# Patient Record
Sex: MALE | Race: WHITE | Employment: FULL TIME | URBAN - METROPOLITAN AREA
[De-identification: names, ages, dates, MRNs, and addresses within clinical notes are randomized per-mention and may not be internally consistent; named-entity substitution may affect disease eponyms.]

---

## 2017-03-11 ENCOUNTER — APPOINTMENT (OUTPATIENT)
Dept: LAB | Age: 47
End: 2017-03-11
Attending: FAMILY MEDICINE
Payer: COMMERCIAL

## 2017-03-11 ENCOUNTER — OFFICE VISIT (OUTPATIENT)
Dept: FAMILY MEDICINE CLINIC | Facility: CLINIC | Age: 47
End: 2017-03-11

## 2017-03-11 VITALS
WEIGHT: 276 LBS | HEIGHT: 71 IN | DIASTOLIC BLOOD PRESSURE: 92 MMHG | RESPIRATION RATE: 18 BRPM | SYSTOLIC BLOOD PRESSURE: 143 MMHG | HEART RATE: 72 BPM | TEMPERATURE: 98 F | BODY MASS INDEX: 38.64 KG/M2

## 2017-03-11 DIAGNOSIS — I10 ESSENTIAL HYPERTENSION: ICD-10-CM

## 2017-03-11 DIAGNOSIS — R41.3 MEMORY DEFICITS: ICD-10-CM

## 2017-03-11 DIAGNOSIS — I10 ESSENTIAL HYPERTENSION: Primary | ICD-10-CM

## 2017-03-11 DIAGNOSIS — H91.90 HEARING LOSS, UNSPECIFIED HEARING LOSS TYPE, UNSPECIFIED LATERALITY: ICD-10-CM

## 2017-03-11 LAB
ALBUMIN SERPL BCP-MCNC: 4.2 G/DL (ref 3.5–4.8)
ALBUMIN/GLOB SERPL: 1.6 {RATIO} (ref 1–2)
ALP SERPL-CCNC: 67 U/L (ref 32–100)
ALT SERPL-CCNC: 28 U/L (ref 17–63)
ANION GAP SERPL CALC-SCNC: 5 MMOL/L (ref 0–18)
AST SERPL-CCNC: 19 U/L (ref 15–41)
BILIRUB SERPL-MCNC: 0.8 MG/DL (ref 0.3–1.2)
BUN SERPL-MCNC: 9 MG/DL (ref 8–20)
BUN/CREAT SERPL: 8.6 (ref 10–20)
CALCIUM SERPL-MCNC: 9.3 MG/DL (ref 8.5–10.5)
CHLORIDE SERPL-SCNC: 108 MMOL/L (ref 95–110)
CHOLEST SERPL-MCNC: 196 MG/DL (ref 110–200)
CO2 SERPL-SCNC: 30 MMOL/L (ref 22–32)
CREAT SERPL-MCNC: 1.05 MG/DL (ref 0.5–1.5)
ERYTHROCYTE [DISTWIDTH] IN BLOOD BY AUTOMATED COUNT: 13.7 % (ref 11–15)
GLOBULIN PLAS-MCNC: 2.7 G/DL (ref 2.5–3.7)
GLUCOSE SERPL-MCNC: 104 MG/DL (ref 70–99)
HCT VFR BLD AUTO: 44.4 % (ref 41–52)
HDLC SERPL-MCNC: 46 MG/DL
HGB BLD-MCNC: 15.3 G/DL (ref 13.5–17.5)
LDLC SERPL CALC-MCNC: 137 MG/DL (ref 0–99)
MCH RBC QN AUTO: 28.4 PG (ref 27–32)
MCHC RBC AUTO-ENTMCNC: 34.4 G/DL (ref 32–37)
MCV RBC AUTO: 82.4 FL (ref 80–100)
NONHDLC SERPL-MCNC: 150 MG/DL
OSMOLALITY UR CALC.SUM OF ELEC: 295 MOSM/KG (ref 275–295)
PLATELET # BLD AUTO: 163 K/UL (ref 140–400)
PMV BLD AUTO: 8.6 FL (ref 7.4–10.3)
POTASSIUM SERPL-SCNC: 5 MMOL/L (ref 3.3–5.1)
PROT SERPL-MCNC: 6.9 G/DL (ref 5.9–8.4)
RBC # BLD AUTO: 5.39 M/UL (ref 4.5–5.9)
SODIUM SERPL-SCNC: 143 MMOL/L (ref 136–144)
TRIGL SERPL-MCNC: 65 MG/DL (ref 1–149)
WBC # BLD AUTO: 4.9 K/UL (ref 4–11)

## 2017-03-11 PROCEDURE — 99212 OFFICE O/P EST SF 10 MIN: CPT | Performed by: FAMILY MEDICINE

## 2017-03-11 PROCEDURE — 85027 COMPLETE CBC AUTOMATED: CPT

## 2017-03-11 PROCEDURE — 99213 OFFICE O/P EST LOW 20 MIN: CPT | Performed by: FAMILY MEDICINE

## 2017-03-11 PROCEDURE — 80061 LIPID PANEL: CPT

## 2017-03-11 PROCEDURE — 36415 COLL VENOUS BLD VENIPUNCTURE: CPT

## 2017-03-11 PROCEDURE — 80053 COMPREHEN METABOLIC PANEL: CPT

## 2017-03-11 RX ORDER — LISINOPRIL 10 MG/1
10 TABLET ORAL
Qty: 30 TABLET | Refills: 11 | Status: SHIPPED | OUTPATIENT
Start: 2017-03-11 | End: 2018-11-27

## 2017-03-11 NOTE — PROGRESS NOTES
HPI:    Patient ID: Chilango Prince is a 40year old male. HPI Comments: Patient is here for follow up for chronic medical issues- HTN. The patient has not been on medications. . There are no acute issues and patient is requesting refills on lisinopril.  The management after testing. To monitor blood pressure; To call if any persistent elevation of blood pressure; Discussed good diet/activity; Follow up as needed. Routine follow up in 1-2 months or as needed.  Information provided for audiology for hearing eval

## 2017-06-15 ENCOUNTER — HOSPITAL ENCOUNTER (INPATIENT)
Facility: HOSPITAL | Age: 47
LOS: 6 days | Discharge: HOME | DRG: 603 | End: 2017-06-21
Attending: EMERGENCY MEDICINE | Admitting: HOSPITALIST
Payer: COMMERCIAL

## 2017-06-15 ENCOUNTER — OFFICE VISIT (OUTPATIENT)
Dept: FAMILY MEDICINE CLINIC | Facility: CLINIC | Age: 47
End: 2017-06-15

## 2017-06-15 ENCOUNTER — APPOINTMENT (OUTPATIENT)
Dept: GENERAL RADIOLOGY | Facility: HOSPITAL | Age: 47
DRG: 603 | End: 2017-06-15
Attending: EMERGENCY MEDICINE
Payer: COMMERCIAL

## 2017-06-15 VITALS
TEMPERATURE: 98 F | WEIGHT: 272 LBS | RESPIRATION RATE: 20 BRPM | HEIGHT: 71 IN | HEART RATE: 93 BPM | DIASTOLIC BLOOD PRESSURE: 78 MMHG | BODY MASS INDEX: 38.08 KG/M2 | SYSTOLIC BLOOD PRESSURE: 123 MMHG

## 2017-06-15 DIAGNOSIS — L08.9 INFECTION OF HAND: Primary | ICD-10-CM

## 2017-06-15 DIAGNOSIS — S61.431A PUNCTURE WOUND OF RIGHT HAND WITH INFECTION, INITIAL ENCOUNTER: Primary | ICD-10-CM

## 2017-06-15 DIAGNOSIS — L08.9 PUNCTURE WOUND OF RIGHT HAND WITH INFECTION, INITIAL ENCOUNTER: Primary | ICD-10-CM

## 2017-06-15 PROCEDURE — 73130 X-RAY EXAM OF HAND: CPT | Performed by: EMERGENCY MEDICINE

## 2017-06-15 PROCEDURE — 99222 1ST HOSP IP/OBS MODERATE 55: CPT | Performed by: HOSPITALIST

## 2017-06-15 PROCEDURE — 90715 TDAP VACCINE 7 YRS/> IM: CPT | Performed by: FAMILY MEDICINE

## 2017-06-15 PROCEDURE — 99213 OFFICE O/P EST LOW 20 MIN: CPT | Performed by: FAMILY MEDICINE

## 2017-06-15 PROCEDURE — 99212 OFFICE O/P EST SF 10 MIN: CPT | Performed by: FAMILY MEDICINE

## 2017-06-15 PROCEDURE — 90471 IMMUNIZATION ADMIN: CPT | Performed by: FAMILY MEDICINE

## 2017-06-15 RX ORDER — LISINOPRIL 10 MG/1
10 TABLET ORAL
Status: DISCONTINUED | OUTPATIENT
Start: 2017-06-16 | End: 2017-06-21

## 2017-06-15 RX ORDER — ACETAMINOPHEN 325 MG/1
650 TABLET ORAL EVERY 4 HOURS PRN
Status: DISCONTINUED | OUTPATIENT
Start: 2017-06-15 | End: 2017-06-21

## 2017-06-15 RX ORDER — SODIUM PHOSPHATE, DIBASIC AND SODIUM PHOSPHATE, MONOBASIC 7; 19 G/133ML; G/133ML
1 ENEMA RECTAL ONCE AS NEEDED
Status: DISCONTINUED | OUTPATIENT
Start: 2017-06-15 | End: 2017-06-21

## 2017-06-15 RX ORDER — MORPHINE SULFATE 2 MG/ML
2 INJECTION, SOLUTION INTRAMUSCULAR; INTRAVENOUS EVERY 2 HOUR PRN
Status: DISCONTINUED | OUTPATIENT
Start: 2017-06-15 | End: 2017-06-21

## 2017-06-15 RX ORDER — MORPHINE SULFATE 2 MG/ML
1 INJECTION, SOLUTION INTRAMUSCULAR; INTRAVENOUS EVERY 2 HOUR PRN
Status: DISCONTINUED | OUTPATIENT
Start: 2017-06-15 | End: 2017-06-21

## 2017-06-15 RX ORDER — BISACODYL 10 MG
10 SUPPOSITORY, RECTAL RECTAL
Status: DISCONTINUED | OUTPATIENT
Start: 2017-06-15 | End: 2017-06-21

## 2017-06-15 RX ORDER — HYDROCODONE BITARTRATE AND ACETAMINOPHEN 5; 325 MG/1; MG/1
1 TABLET ORAL EVERY 4 HOURS PRN
Status: DISCONTINUED | OUTPATIENT
Start: 2017-06-15 | End: 2017-06-21

## 2017-06-15 RX ORDER — KETOROLAC TROMETHAMINE 30 MG/ML
30 INJECTION, SOLUTION INTRAMUSCULAR; INTRAVENOUS ONCE
Status: COMPLETED | OUTPATIENT
Start: 2017-06-15 | End: 2017-06-15

## 2017-06-15 RX ORDER — ZOLPIDEM TARTRATE 5 MG/1
5 TABLET ORAL NIGHTLY PRN
Status: DISCONTINUED | OUTPATIENT
Start: 2017-06-15 | End: 2017-06-21

## 2017-06-15 RX ORDER — AMOXICILLIN AND CLAVULANATE POTASSIUM 875; 125 MG/1; MG/1
1 TABLET, FILM COATED ORAL 2 TIMES DAILY
Qty: 20 TABLET | Refills: 0 | Status: ON HOLD | OUTPATIENT
Start: 2017-06-15 | End: 2017-06-21

## 2017-06-15 RX ORDER — POLYETHYLENE GLYCOL 3350 17 G/17G
17 POWDER, FOR SOLUTION ORAL DAILY PRN
Status: DISCONTINUED | OUTPATIENT
Start: 2017-06-15 | End: 2017-06-21

## 2017-06-15 RX ORDER — DOCUSATE SODIUM 100 MG/1
100 CAPSULE, LIQUID FILLED ORAL 2 TIMES DAILY
Status: DISCONTINUED | OUTPATIENT
Start: 2017-06-15 | End: 2017-06-21

## 2017-06-15 RX ORDER — ONDANSETRON 2 MG/ML
4 INJECTION INTRAMUSCULAR; INTRAVENOUS EVERY 6 HOURS PRN
Status: DISCONTINUED | OUTPATIENT
Start: 2017-06-15 | End: 2017-06-21

## 2017-06-15 RX ORDER — MORPHINE SULFATE 4 MG/ML
4 INJECTION, SOLUTION INTRAMUSCULAR; INTRAVENOUS EVERY 2 HOUR PRN
Status: DISCONTINUED | OUTPATIENT
Start: 2017-06-15 | End: 2017-06-21

## 2017-06-15 RX ORDER — HYDROCODONE BITARTRATE AND ACETAMINOPHEN 5; 325 MG/1; MG/1
2 TABLET ORAL EVERY 4 HOURS PRN
Status: DISCONTINUED | OUTPATIENT
Start: 2017-06-15 | End: 2017-06-21

## 2017-06-15 NOTE — ED PROVIDER NOTES
Patient Seen in: [facility] AND [facility] Emergency Department    History   Patient presents with:  Cellulitis (integumentary, infectious)    Stated Complaint:     HPI    Patient is a 23-year-old male who states that yesterday at work he cut the palmar surface crease of the fourth MP joint on the palmar surface. There is surrounding redness and warmth. There is lymphangitic lines of the dorsal surface of the hand. There is pain with passive range of motion of the fourth finger.   3 motor function and capillary

## 2017-06-15 NOTE — PROGRESS NOTES
[facility] Dosing Service  Antibiotic Dosing    Karina Dougherty is a 40year old male for whom pharmacy is dosing Zosyn for treatment of  cellulitis and pneumonia. .  Other antibiotics (Not dosed by pharmacy): none    Allergies: has No Known Allergies.     V

## 2017-06-15 NOTE — PLAN OF CARE
Problem: Patient Centered Care  Goal: Patient preferences are identified and integrated in the patient’s plan of care  Interventions:  - What would you like us to know as we care for you?   - Provide timely, complete, and accurate information to patient/fa serum osmolarity and serum sodium as indicated or ordered  - Monitor response to interventions for patient’s volume status, including labs, urine output, blood pressure (other measures as available)  - Encourage oral intake as appropriate  - Instruct patie

## 2017-06-15 NOTE — ED INITIAL ASSESSMENT (HPI)
Pt sent here from dr Arleen Cha office for infected laceration to right ring finger that occurred yest while at work pt now has numbness, red streak, and decreased movement to finger. Pt was given a tetanus shot at the office pta.  Denies fever

## 2017-06-15 NOTE — PROGRESS NOTES
HPI:    Patient ID: Sondra Espinal is a 40year old male. HPI Comments: Pt presents with a superficial cut that occurred at work yesterday moving a palette. Pt had mild bleeding. Pt cleaned area and placed band aid on the area.  Pt also had a abrasion of t tablet by mouth 2 (two) times daily.            Imaging & Referrals:  TETANUS, DIPHTHERIA TOXOIDS AND ACELLULAR PERTUSIS VACCINE (TDAP), >7 YEARS, IM USE  PLASTIC SURGERY - INTERNAL       #[id number]

## 2017-06-16 PROCEDURE — 99252 IP/OBS CONSLTJ NEW/EST SF 35: CPT | Performed by: PLASTIC SURGERY

## 2017-06-16 PROCEDURE — 99233 SBSQ HOSP IP/OBS HIGH 50: CPT | Performed by: HOSPITALIST

## 2017-06-16 NOTE — PLAN OF CARE
Problem: Patient Centered Care  Goal: Patient preferences are identified and integrated in the patient’s plan of care  Interventions:  - What would you like us to know as we care for you?   - Provide timely, complete, and accurate information to patient/fa serum osmolarity and serum sodium as indicated or ordered  - Monitor response to interventions for patient’s volume status, including labs, urine output, blood pressure (other measures as available)  - Encourage oral intake as appropriate  - Instruct patie interventions unsuccessful or patient reports new pain   Outcome: Progressing    Problem: SAFETY ADULT - FALL  Goal: Free from fall injury  INTERVENTIONS:  - Assess pt frequently for physical needs  - Identify cognitive and physical deficits and behaviors

## 2017-06-16 NOTE — PROGRESS NOTES
[facility]    Progress Note    Alpheus Boast Patient Status:  Inpatient    1970 MRN [id number]   Location [facility] 5SW/SE Attending Beba Lopes MD   Hosp Day # 1 PCP Javier Foote MD     Subjective:     Constitut prophylaxis.  Patient low risk.  Encourage ambulation.  SCDs  Dispo: pending, continue IV anabiotics      Results:     Lab Results  Component Value Date   WBC 15.2* 06/16/2017   HGB 13.4* 06/16/2017   HCT 38.7* 06/16/2017    06/16/2017   CREATSERUM 0

## 2017-06-16 NOTE — CONSULTS
[facility]    PATIENT'S NAME: WALLACE FLORES   ATTENDING PHYSICIAN: Juan Millan MD   CONSULTING PHYSICIAN: Ramu Todd MD   PATIENT ACCOUNT#:   [id number]    LOCATION:  [address] [place] [address] #:   [id number]       DATE OF Hemoglobin is 13.4. IMPRESSION:  Right hand cellulitis secondary to right thumb paronychia. DISPOSITION:  There is no abscess or purulent tenosynovitis. No incision and drainage is indicated at this point.   This is a soft tissue infection with c

## 2017-06-16 NOTE — PLAN OF CARE
Problem: Patient Centered Care  Goal: Patient preferences are identified and integrated in the patient’s plan of care  Interventions:  - What would you like us to know as we care for you?  - Provide timely, complete, and accurate information to patient/fam serum osmolarity and serum sodium as indicated or ordered  - Monitor response to interventions for patient’s volume status, including labs, urine output, blood pressure (other measures as available)  - Encourage oral intake as appropriate  - Instruct patie interventions unsuccessful or patient reports new pain  Outcome: Progressing    Problem: SAFETY ADULT - FALL  Goal: Free from fall injury  INTERVENTIONS:  - Assess pt frequently for physical needs  - Identify cognitive and physical deficits and behaviors t

## 2017-06-16 NOTE — CONSULTS
INFECTIOUS DISEASE CONSULT NOTE    McDonald Grade Patient Status:  Inpatient    1970 MRN [id number]   Location [facility] 5SW/SE Attending Waleska Shen MD   Hosp Day # 1 PCP Nini Clarke 5-325 MG per tab 1 tablet, 1 tablet, Oral, Q4H PRN **OR** HYDROcodone-acetaminophen (NORCO) 5-325 MG per tab 2 tablet, 2 tablet, Oral, Q4H PRN  •  morphINE sulfate (PF) 2 MG/ML injection 1 mg, 1 mg, Intravenous, Q2H PRN **OR** morphINE sulfate (PF) 2 MG/ML 15.2*   PLT  141     Recent Labs   Lab  06/15/17   1027   GLU  108*   BUN  13   CREATSERUM  0.90   GFRAA  >60   GFRNAA  >60   CA  9.1   NA  138   K  3.7   CL  103   CO2  24       Microbiology    Reviewed in EMR    Radiology: Reviewed      Antibiotics: vanc

## 2017-06-16 NOTE — PROGRESS NOTES
[facility] dosing service    Initial Pharmacokinetic Consult for Vancomycin Dosing     Maryse Saucedo is a 40year old male admitted on 6/16 who is being treated for cellulitis.   Pharmacy has been asked to dose Vancomycin by Dr. Florin Lopez    He has No Known ug/mL.    3.  Pharmacy will need BUN/Scr daily while on Vancomycin to assess renal function. 4.  Pharmacy will follow and monitor renal function changes, toxicity and efficacy. Pharmacy will continue to follow him.   We appreciate the opportunity to a

## 2017-06-16 NOTE — H&P
[facility]    PATIENT'S NAME: WALLACE FLORES   ATTENDING PHYSICIAN: rBe Cruz MD   PATIENT ACCOUNT#:   [id number]    LOCATION:  [address] [place] [address] Road #:   [id number]       YOB: 1970  ADMISSION DATE:       06/15/2 PHYSICAL EXAMINATION:    GENERAL:  The patient is very pleasant, cooperative. Wife at bedside. HEENT:  Pupils round, reactive to light. Extraocular movements intact. Sclerae:  No icterus, no pallor.   Oral mucosa moist.  Throat:  No exudate, no eryt Encourage ambulation. I discussed with patient and his wife at the bedside. Both are agreeable to proposed management plan as outlined above.     Dictated By Peggy Marrero MD  d: 06/15/2017 18:54:30  t: 06/15/2017 20:59:42  Job [id number]

## 2017-06-16 NOTE — CONSULTS
PLASTIC SURGERY - FULL CONSULTATION DICTATED      IMPRESSION:  Right hand Cellulitis with lymphangiitis     DISPOSITION / PLAN:  Patient had a thumb paronychia draining pus several days ago. Developed cellulitis and lymphangiitis.   No fluctuance, or purul

## 2017-06-17 PROCEDURE — 99232 SBSQ HOSP IP/OBS MODERATE 35: CPT | Performed by: HOSPITALIST

## 2017-06-17 RX ORDER — 0.9 % SODIUM CHLORIDE 0.9 %
VIAL (ML) INJECTION
Status: COMPLETED
Start: 2017-06-17 | End: 2017-06-17

## 2017-06-17 NOTE — PROGRESS NOTES
[place] [facility]    Progress Note    Nya Jenkins Patient Status:  Inpatient    1970 MRN [id number]   Location [facility] 5SW/SE Attending Mason Ambriz MD   Hosp Day # 2 PCP Leia Calloway MD     Subjective:     Constitut pad    Hypertension, stable.  Continue medications. Deep venous thrombosis prophylaxis.  Patient low risk.  Encourage ambulation.  SCDs  Dispo: pending, continue IV anabiotics    D/w pt, his wife, RN, and ID        Results:       Lab Results  Component V

## 2017-06-17 NOTE — PROGRESS NOTES
[facility]    Progress Note    Shanae Tay Patient Status:  Inpatient    1970 MRN [id number]   Location [facility] 5SW/SE Attending Jeremy Matias MD   Hosp Day # 1 PCP Lisa Brown MD     Subjective:     Constitut prophylaxis.  Patient low risk.  Encourage ambulation. SCDs  Dispo: pending, continue IV anabiotics    D/w pt, his wife, RN, and ID  Greater than 35 minutes spent, >50% spent counseling and coordinating of care as outlined above.       Results:       Lab Re

## 2017-06-17 NOTE — PROGRESS NOTES
INFECTIOUS DISEASE PROGRESS NOTE    Karina Dougherty Patient Status:  Inpatient    1970 MRN [id number]   Location [facility] 5SW/SE Attending Adelina Loja MD   Hosp Day # 2 PCP Nini Kim MD     Subjective:  Patient seen and examined patient, wife    [facility]  [phone]

## 2017-06-17 NOTE — PLAN OF CARE
Problem: Patient Centered Care  Goal: Patient preferences are identified and integrated in the patient’s plan of care  Interventions:  - What would you like us to know as we care for you?  - Provide timely, complete, and accurate information to patient/fam serum osmolarity and serum sodium as indicated or ordered  - Monitor response to interventions for patient’s volume status, including labs, urine output, blood pressure (other measures as available)  - Encourage oral intake as appropriate  - Instruct patie interventions unsuccessful or patient reports new pain   Outcome: Progressing    Problem: SAFETY ADULT - FALL  Goal: Free from fall injury  INTERVENTIONS:  - Assess pt frequently for physical needs  - Identify cognitive and physical deficits and behaviors

## 2017-06-18 PROCEDURE — 99232 SBSQ HOSP IP/OBS MODERATE 35: CPT | Performed by: HOSPITALIST

## 2017-06-18 RX ORDER — 0.9 % SODIUM CHLORIDE 0.9 %
VIAL (ML) INJECTION
Status: COMPLETED
Start: 2017-06-18 | End: 2017-06-18

## 2017-06-18 NOTE — PLAN OF CARE
Problem: Patient Centered Care  Goal: Patient preferences are identified and integrated in the patient’s plan of care  Interventions:  - What would you like us to know as we care for you?  - Provide timely, complete, and accurate information to patient/fam Instruct patient on fluid and nutrition restrictions as appropriate   Outcome: Progressing    Problem: SKIN/TISSUE INTEGRITY - ADULT  Goal: Skin integrity remains intact  INTERVENTIONS  - Assess and document risk factors for pressure ulcer development  - A based on assessment  - Modify environment to reduce risk of injury  - Provide assistive devices as appropriate  - Consider OT/PT consult to assist with strengthening/mobility  - Encourage toileting schedule   Outcome: Progressing    Comments:   Vanco troug

## 2017-06-18 NOTE — PROGRESS NOTES
[facility] dosing service    Follow-up Pharmacokinetic Consult for Vancomycin Dosing     Ann Sample is a 40year old male admitted on 6/16/2017 who is being treated for cellulitis. Patient is on day 2 of Vancomycin 1500mg IV q12h.   Goal trough is 10- [facility]  6/17/2017  10:11 PM  [address] Extension: [phone]

## 2017-06-18 NOTE — PROGRESS NOTES
[place] [facility] - [facility]    Progress Note    Larry Paling Patient Status:  Inpatient    1970 MRN [id number]   Location [facility] 5SW/SE Attending Kenya Martinez MD   Hosp Day # 3 PCP Filippo Quintanilla MD     Subjective:     Constitut control.     - elevate  - warm pad    Hypertension, stable.  Continue medications. Deep venous thrombosis prophylaxis.  Patient low risk.  Encourage ambulation.  SCDs  Dispo: pending, continue IV anabiotics    D/w pt, his wife, RN, and ID        Results:

## 2017-06-19 ENCOUNTER — APPOINTMENT (OUTPATIENT)
Dept: MRI IMAGING | Facility: HOSPITAL | Age: 47
DRG: 603 | End: 2017-06-19
Attending: HOSPITALIST
Payer: COMMERCIAL

## 2017-06-19 PROCEDURE — 99232 SBSQ HOSP IP/OBS MODERATE 35: CPT | Performed by: HOSPITALIST

## 2017-06-19 PROCEDURE — 73218 MRI UPPER EXTREMITY W/O DYE: CPT | Performed by: HOSPITALIST

## 2017-06-19 RX ORDER — 0.9 % SODIUM CHLORIDE 0.9 %
VIAL (ML) INJECTION
Status: COMPLETED
Start: 2017-06-19 | End: 2017-06-19

## 2017-06-19 NOTE — PLAN OF CARE
Problem: Patient/Family Goals  Goal: Patient/Family Long Term Goal  Patient’s Long Term Goal: To go home. Interventions:  - Take medications as prescribed by your doctor. - Labs as ordered.   - See additional Care Plan goals for specific interventions Instruct patient on fluid and nutrition restrictions as appropriate   Outcome: Progressing    Problem: SKIN/TISSUE INTEGRITY - ADULT  Goal: Skin integrity remains intact  INTERVENTIONS  - Assess and document risk factors for pressure ulcer development  - A Problem: SAFETY ADULT - FALL  Goal: Free from fall injury  INTERVENTIONS:  - Assess pt frequently for physical needs  - Identify cognitive and physical deficits and behaviors that affect risk of falls.   - [place] fall precautions as indicated by asse

## 2017-06-19 NOTE — PROGRESS NOTES
120 [facility] dosing service    Follow-up Pharmacokinetic Consult for Vancomycin Dosing     Holly Marie is a 40year old mal who is being treated for cellulitis. Patient is on day 4 of Vancomycin 1.75 gm IV Q 8 hours. Goal trough is 10-15 ug/mL.     He h BUN/Scr daily while on Vancomycin to assess renal function. 4.  Pharmacy will follow and monitor renal function changes, toxicity and efficacy.     Olympia Apgar, PharmD  2017  9:25 AM  [facility] Extension: [phone]

## 2017-06-19 NOTE — PROGRESS NOTES
[place] [facility] - [facility]    Progress Note    Dioni Bartholomew Patient Status:  Inpatient    1970 MRN [id number]   Location [facility] 5SW/SE Attending Glory Lundberg MD   Hosp Day # 4 PCP Stephanie Panchal MD     Subjective:     Constitu Pain control.     - elevate  - warm pad  - MRI today    Hypertension, stable.  Continue medications. Deep venous thrombosis prophylaxis.  Patient low risk.  Encourage ambulation.  SCDs  Dispo: pending, continue IV anabiotics    D/w pt, his wife, RN, and

## 2017-06-19 NOTE — PROGRESS NOTES
INFECTIOUS DISEASE PROGRESS NOTE    Radha Parrish Patient Status:  Inpatient    1970 MRN [id number]   Location [facility] 5SW/SE Attending Preet Davis MD   Hosp Day # 4 PCP José Miguel Hernandes MD     Subjective:  Patient seen and examined reviewed labs, micro, imaging report  - discussed with patient, RN, primary    Marylee Kocher, MD  [facility]  [phone]

## 2017-06-19 NOTE — PLAN OF CARE
Problem: Patient Centered Care  Goal: Patient preferences are identified and integrated in the patient’s plan of care  Interventions:  - What would you like us to know as we care for you?   - Provide timely, complete, and accurate information to patient/fa serum osmolarity and serum sodium as indicated or ordered  - Monitor response to interventions for patient’s volume status, including labs, urine output, blood pressure (other measures as available)  - Encourage oral intake as appropriate  - Instruct patie physical deficits and behaviors that affect risk of falls.   - [place] fall precautions as indicated by assessment.  - Educate pt/family on patient safety including physical limitations  - Instruct pt to call for assistance with activity based on assessme

## 2017-06-19 NOTE — PROGRESS NOTES
[facility]    Progress Note    Vanessa Norwood Patient Status:  Inpatient    1970 MRN [id number]   Location [facility] 5SW/SE Attending Radha Subramanian MD   Hosp Day # 4 PCP Maximilian Lynn MD     Subjective:     Constitu Pain control.     - elevate  - warm pad  - MRI today    Hypertension, stable.  Continue medications. Deep venous thrombosis prophylaxis.  Patient low risk.  Encourage ambulation.  SCDs  Dispo: pending, continue IV anabiotics    D/w pt, his wife, RN, and

## 2017-06-20 PROCEDURE — 99232 SBSQ HOSP IP/OBS MODERATE 35: CPT | Performed by: HOSPITALIST

## 2017-06-20 RX ORDER — 0.9 % SODIUM CHLORIDE 0.9 %
VIAL (ML) INJECTION
Status: COMPLETED
Start: 2017-06-20 | End: 2017-06-20

## 2017-06-20 NOTE — PLAN OF CARE
Problem: Patient Centered Care  Goal: Patient preferences are identified and integrated in the patient’s plan of care  Interventions:  - What would you like us to know as we care for you? Patient wants to be kept updated  - Provide timely, complete, and acc and assess for signs and symptoms of volume excess or deficit  - Monitor intake, output and patient weight  - Monitor urine specific gravity, serum osmolarity and serum sodium as indicated or ordered  - Monitor response to interventions for patient’s volum and evaluate response  - Consider cultural and social influences on pain and pain management  - Manage/alleviate anxiety  - Utilize distraction and/or relaxation techniques  - Monitor for opioid side effects  - Notify MD/LIP if interventions unsuccessful o

## 2017-06-20 NOTE — PROGRESS NOTES
[place] [facility] - [facility]    Progress Note    Ann Sample Patient Status:  Inpatient    1970 MRN [id number]   Location [facility] 5SW/SE Attending Scooby Zimmerman MD   Hosp Day # 5 PCP Clau Parada MD     Subjective:     Constitu abscess, no evidence of osteo-    Hypertension, stable.  Continue medications. Deep venous thrombosis prophylaxis.  Patient low risk.  Encourage ambulation.  SCDs  Dispo: one more day of IV anabiotics, home tomorrow on orals    D/w pt, his wife, RN, and

## 2017-06-20 NOTE — PROGRESS NOTES
[place] [facility] - [facility]    Progress Note    Ainsley Kendrick Patient Status:  Inpatient    1970 MRN [id number]   Location [facility] 5SW/SE Attending Citlalli Hughes MD   Hosp Day # 5 PCP Brit Ferrell MD     Subjective:     Constit any abscess, no evidence of osteo  - lost iv access, RN to re attempt, no PICC as patient will be d/c home on orals. Hypertension, stable.  Continue medications. Deep venous thrombosis prophylaxis.  Patient low risk.  Encourage ambulation.  SCDs  Dis

## 2017-06-21 VITALS
BODY MASS INDEX: 38.08 KG/M2 | WEIGHT: 272 LBS | HEART RATE: 69 BPM | SYSTOLIC BLOOD PRESSURE: 128 MMHG | DIASTOLIC BLOOD PRESSURE: 76 MMHG | TEMPERATURE: 98 F | OXYGEN SATURATION: 95 % | HEIGHT: 71 IN | RESPIRATION RATE: 18 BRPM

## 2017-06-21 PROCEDURE — 99239 HOSP IP/OBS DSCHRG MGMT >30: CPT | Performed by: HOSPITALIST

## 2017-06-21 RX ORDER — DOXYCYCLINE 100 MG/1
100 TABLET ORAL 2 TIMES DAILY
Qty: 14 TABLET | Refills: 0 | Status: SHIPPED | OUTPATIENT
Start: 2017-06-21 | End: 2017-06-28

## 2017-06-21 RX ORDER — DOXYCYCLINE 100 MG/1
100 TABLET ORAL 2 TIMES DAILY
Qty: 14 TABLET | Refills: 0 | Status: SHIPPED | OUTPATIENT
Start: 2017-06-21 | End: 2017-06-21

## 2017-06-21 RX ORDER — AMOXICILLIN AND CLAVULANATE POTASSIUM 875; 125 MG/1; MG/1
1 TABLET, FILM COATED ORAL 2 TIMES DAILY
Qty: 14 TABLET | Refills: 0 | Status: SHIPPED | OUTPATIENT
Start: 2017-06-21 | End: 2017-06-28

## 2017-06-21 RX ORDER — AMOXICILLIN AND CLAVULANATE POTASSIUM 875; 125 MG/1; MG/1
1 TABLET, FILM COATED ORAL 2 TIMES DAILY
Qty: 14 TABLET | Refills: 0 | Status: SHIPPED | OUTPATIENT
Start: 2017-06-21 | End: 2017-06-21

## 2017-06-21 NOTE — PROGRESS NOTES
INFECTIOUS DISEASE PROGRESS NOTE    Clarita Alexander Patient Status:  Inpatient    1970 MRN [id number]   Location [facility] 5SW/SE Attending Abilio Bartholomew MD   Hosp Day # 5 PCP Yajaira Epstein MD     Subjective:  Patient seen and examined labs, micro, imaging report  - discussed with patient, RN, primary    Josh Tyler MD  [facility]  [phone]

## 2017-06-21 NOTE — PLAN OF CARE
Problem: Patient Centered Care  Goal: Patient preferences are identified and integrated in the patient’s plan of care  Interventions:  - What would you like us to know as we care for you?  Patient wants to go home today  - Provide timely, complete, and accu Monitor labs and assess for signs and symptoms of volume excess or deficit  - Monitor intake, output and patient weight  - Monitor urine specific gravity, serum osmolarity and serum sodium as indicated or ordered  - Monitor response to interventions for pa influences on pain and pain management  - Manage/alleviate anxiety  - Utilize distraction and/or relaxation techniques  - Monitor for opioid side effects  - Notify MD/LIP if interventions unsuccessful or patient reports new pain   Outcome: Lashae Hagen

## 2017-06-21 NOTE — PROGRESS NOTES
Patient was seen and examined. Much improvement to hand redness and swelling, including improvement in redness on right forearm. Discussed discharge plans with patient and wife at bedside. Discussed discharge meds, follow up, and instructions.   All que

## 2017-06-21 NOTE — PLAN OF CARE
Pt discharge from unit, verbal and written prescriptions given to pt all questions answered, PIV removed.  Pt left unit in stable condition, j

## 2017-06-21 NOTE — DISCHARGE SUMMARY
[facility] HOSPITALIST  DISCHARGE SUMMARY     Chevis Maker Patient Status:  Inpatient    1970 MRN [id number]   Location [facility] 5SW/SE Attending Joni Lim MD   Hosp Day # 6 PCP Stacey Em MD     DATE OF ADMISSION: 6/15/2017  DATE Roxana Upton intact distal pulses. No gallop, rub, murmur. Pulm: Effort and breath sounds normal. No distress, wheezes, rales, rhonchi. Abd: Soft, NTND, BS normal, no mass, no HSM, no rebound/guarding. Neuro: Normal reflexes, CN.  Sensory/motor exams grossly normal

## 2017-06-22 ENCOUNTER — TELEPHONE (OUTPATIENT)
Dept: INTERNAL MEDICINE UNIT | Facility: HOSPITAL | Age: 47
End: 2017-06-22

## 2017-06-22 NOTE — TELEPHONE ENCOUNTER
Please schedule patient for a hospital follow up appointment with pcp . Pt was discharged on 6/21/17.

## 2017-06-22 NOTE — PAYOR COMM NOTE
REF: [id number]  REQUESTING ADDITIONAL DAYS    6/17/17  Subjective:      Constitutional: Negative for fever, chills and fatigue.    Respiratory: Negative for cough, shortness of breath and wheezing.    Cardiovascular: Negative for chest pain and leg swelli anabiotics    D/w pt, his wife, RN, and ID          Results:        Lab Results  Component  Value  Date    [facility] MEJIAS 11.9*  06/17/2017    HGB  12.9*  06/17/2017    HCT  37.2*  06/17/2017    PLT  141  06/17/2017    CREATSERUM  0.90  06/15/2017    BUN  13  06/15/ ambulation.  SCDs  Dispo: pending, continue IV anabiotics            6/19/17  Skin:         Right hand pain/redness looks better today, +streaks of redness on right forearm not worse, decreased swelling, increased ROM in hand/fingers   Neurological: Negativ 18, height 5' 11\" (1.803 m), weight 272 lb (123.378 kg), SpO2 99 %.     Constitutional: He is oriented to person, place, and time. He appears well-nourished. Eyes: EOM are normal.   Neck: Normal range of motion.    Cardiovascular: Normal rate.    Pulmona

## 2017-07-03 ENCOUNTER — OFFICE VISIT (OUTPATIENT)
Dept: FAMILY MEDICINE CLINIC | Facility: CLINIC | Age: 47
End: 2017-07-03

## 2017-07-03 VITALS
SYSTOLIC BLOOD PRESSURE: 123 MMHG | HEART RATE: 71 BPM | WEIGHT: 274 LBS | HEIGHT: 71 IN | BODY MASS INDEX: 38.36 KG/M2 | TEMPERATURE: 98 F | DIASTOLIC BLOOD PRESSURE: 88 MMHG

## 2017-07-03 DIAGNOSIS — L03.113 CELLULITIS OF HAND, RIGHT: ICD-10-CM

## 2017-07-03 PROCEDURE — 99212 OFFICE O/P EST SF 10 MIN: CPT | Performed by: FAMILY MEDICINE

## 2017-07-03 PROCEDURE — 99213 OFFICE O/P EST LOW 20 MIN: CPT | Performed by: FAMILY MEDICINE

## 2017-07-03 NOTE — PROGRESS NOTES
HPI:    Patient ID: Hallie Menjivar is a 40year old male. Patient is here for follow up from recent hospitalization for right hand cellulitis. Evaluation - MRI hand and blood cultures were unremarkable.  See discharge note for details of admission and hosp and Unasyn. ID and plastic surgery were consulted. No surgery necessary. MRI did not show deeper infection or osteomyelitis. Patient started to improve after several days. Swelling and lymphangitis resolved. Pain resolved.   Blood cultures remain nega follow-up instructions were reviewed with the patient and they verbalized understanding and agreement. They understand to return to the emergency room for any concerning signs or symptoms.   Greater than 30 minutes spent on discharge              Review of

## 2017-12-30 ENCOUNTER — OFFICE VISIT (OUTPATIENT)
Dept: FAMILY MEDICINE CLINIC | Facility: CLINIC | Age: 47
End: 2017-12-30

## 2017-12-30 VITALS
BODY MASS INDEX: 38.64 KG/M2 | TEMPERATURE: 98 F | WEIGHT: 276 LBS | DIASTOLIC BLOOD PRESSURE: 88 MMHG | SYSTOLIC BLOOD PRESSURE: 132 MMHG | HEART RATE: 91 BPM | HEIGHT: 71 IN

## 2017-12-30 DIAGNOSIS — J01.10 ACUTE NON-RECURRENT FRONTAL SINUSITIS: Primary | ICD-10-CM

## 2017-12-30 DIAGNOSIS — J06.9 ACUTE UPPER RESPIRATORY INFECTION: ICD-10-CM

## 2017-12-30 PROBLEM — S61.431A PUNCTURE WOUND OF RIGHT HAND WITH INFECTION: Status: RESOLVED | Noted: 2017-06-15 | Resolved: 2017-12-30

## 2017-12-30 PROBLEM — L08.9 PUNCTURE WOUND OF RIGHT HAND WITH INFECTION: Status: RESOLVED | Noted: 2017-06-15 | Resolved: 2017-12-30

## 2017-12-30 PROBLEM — S61.431A: Status: RESOLVED | Noted: 2017-06-15 | Resolved: 2017-12-30

## 2017-12-30 PROBLEM — I10 ESSENTIAL HYPERTENSION: Status: ACTIVE | Noted: 2017-12-30

## 2017-12-30 PROBLEM — L08.9: Status: RESOLVED | Noted: 2017-06-15 | Resolved: 2017-12-30

## 2017-12-30 PROCEDURE — 99213 OFFICE O/P EST LOW 20 MIN: CPT | Performed by: PHYSICIAN ASSISTANT

## 2017-12-30 PROCEDURE — 99212 OFFICE O/P EST SF 10 MIN: CPT | Performed by: PHYSICIAN ASSISTANT

## 2017-12-30 RX ORDER — CODEINE PHOSPHATE AND GUAIFENESIN 10; 100 MG/5ML; MG/5ML
5 SOLUTION ORAL EVERY 6 HOURS PRN
Qty: 118 ML | Refills: 1 | Status: SHIPPED | OUTPATIENT
Start: 2017-12-30 | End: 2018-11-27

## 2017-12-30 RX ORDER — AMOXICILLIN AND CLAVULANATE POTASSIUM 875; 125 MG/1; MG/1
1 TABLET, FILM COATED ORAL 2 TIMES DAILY
Qty: 20 TABLET | Refills: 0 | Status: SHIPPED | OUTPATIENT
Start: 2017-12-30 | End: 2018-01-09

## 2017-12-30 NOTE — PROGRESS NOTES
HPI:    Patient ID: Ranjeet Page is a 56year old male. Patient presents for cough, chest congestion, sinus congestion, frontal headache, bilateral ear pain, and fatigue for over one week. Symptoms began as cold symptoms but have been worsening.   He de oropharynx is clear and moist and mucous membranes are normal.   Neck: Neck supple. Cardiovascular: Normal rate, regular rhythm and normal heart sounds. Pulmonary/Chest: Effort normal and breath sounds normal. No respiratory distress.  He has no wheeze

## 2018-01-15 ENCOUNTER — NURSE TRIAGE (OUTPATIENT)
Dept: OTHER | Age: 48
End: 2018-01-15

## 2018-01-15 RX ORDER — AMOXICILLIN AND CLAVULANATE POTASSIUM 875; 125 MG/1; MG/1
1 TABLET, FILM COATED ORAL 2 TIMES DAILY
Qty: 20 TABLET | Refills: 0 | Status: SHIPPED | OUTPATIENT
Start: 2018-01-15 | End: 2018-01-25

## 2018-01-15 NOTE — TELEPHONE ENCOUNTER
Message noted. Rx for antibiotic sent to pharmacy. Patient should schedule office visit if symptoms are not improving or with any worsening.

## 2018-01-15 NOTE — TELEPHONE ENCOUNTER
Action Requested: Summary for Provider     []  Critical Lab, Recommendations Needed  [x] Need Additional Advice  []   FYI    []   Need Orders  [x] Need Medications Sent to Pharmacy  []  Other     SUMMARY: patient last seen and treated for sinusitis 12/30/1

## 2018-11-27 ENCOUNTER — OFFICE VISIT (OUTPATIENT)
Dept: FAMILY MEDICINE CLINIC | Facility: CLINIC | Age: 48
End: 2018-11-27
Payer: COMMERCIAL

## 2018-11-27 VITALS
DIASTOLIC BLOOD PRESSURE: 83 MMHG | SYSTOLIC BLOOD PRESSURE: 141 MMHG | HEIGHT: 70.5 IN | WEIGHT: 281 LBS | HEART RATE: 97 BPM | RESPIRATION RATE: 20 BRPM | BODY MASS INDEX: 39.78 KG/M2

## 2018-11-27 DIAGNOSIS — I10 ESSENTIAL HYPERTENSION: Primary | ICD-10-CM

## 2018-11-27 PROCEDURE — 99213 OFFICE O/P EST LOW 20 MIN: CPT | Performed by: FAMILY MEDICINE

## 2018-11-27 PROCEDURE — 99212 OFFICE O/P EST SF 10 MIN: CPT | Performed by: FAMILY MEDICINE

## 2018-11-27 RX ORDER — LISINOPRIL 10 MG/1
10 TABLET ORAL
Qty: 90 TABLET | Refills: 3 | Status: SHIPPED | OUTPATIENT
Start: 2018-11-27 | End: 2020-10-13

## 2018-11-27 NOTE — PROGRESS NOTES
HPI:    Patient ID: Radha Parrish is a 53year old male. Patient is here for follow up for chronic medical issues- hypertension. The patient is compliant with medications and no side effects. There are no acute issues and patient is requesting refills.  H [E]      Meds This Visit:  Requested Prescriptions     Signed Prescriptions Disp Refills   • lisinopril 10 MG Oral Tab 90 tablet 3     Sig: Take 1 tablet (10 mg total) by mouth once daily.        Imaging & Referrals:  None       AF#[id number]

## 2019-02-16 ENCOUNTER — APPOINTMENT (OUTPATIENT)
Dept: LAB | Age: 49
End: 2019-02-16
Attending: FAMILY MEDICINE
Payer: COMMERCIAL

## 2019-02-16 DIAGNOSIS — I10 ESSENTIAL HYPERTENSION: ICD-10-CM

## 2019-02-16 LAB
ALBUMIN SERPL-MCNC: 4 G/DL (ref 3.4–5)
ALBUMIN/GLOB SERPL: 1.3 {RATIO} (ref 1–2)
ALP LIVER SERPL-CCNC: 61 U/L (ref 45–117)
ALT SERPL-CCNC: 46 U/L (ref 16–61)
ANION GAP SERPL CALC-SCNC: 5 MMOL/L (ref 0–18)
AST SERPL-CCNC: 17 U/L (ref 15–37)
BILIRUB SERPL-MCNC: 0.7 MG/DL (ref 0.1–2)
BUN BLD-MCNC: 11 MG/DL (ref 7–18)
BUN/CREAT SERPL: 10.8 (ref 10–20)
CALCIUM BLD-MCNC: 9.1 MG/DL (ref 8.5–10.1)
CHLORIDE SERPL-SCNC: 106 MMOL/L (ref 98–107)
CHOLEST SMN-MCNC: 211 MG/DL (ref ?–200)
CO2 SERPL-SCNC: 29 MMOL/L (ref 21–32)
CREAT BLD-MCNC: 1.02 MG/DL (ref 0.7–1.3)
DEPRECATED RDW RBC AUTO: 40.5 FL (ref 35.1–46.3)
ERYTHROCYTE [DISTWIDTH] IN BLOOD BY AUTOMATED COUNT: 13.3 % (ref 11–15)
GLOBULIN PLAS-MCNC: 3.1 G/DL (ref 2.8–4.4)
GLUCOSE BLD-MCNC: 104 MG/DL (ref 70–99)
HCT VFR BLD AUTO: 48.4 % (ref 39–53)
HDLC SERPL-MCNC: 49 MG/DL (ref 40–59)
HGB BLD-MCNC: 16.1 G/DL (ref 13–17.5)
LDLC SERPL CALC-MCNC: 127 MG/DL (ref ?–100)
M PROTEIN MFR SERPL ELPH: 7.1 G/DL (ref 6.4–8.2)
MCH RBC QN AUTO: 28.1 PG (ref 26–34)
MCHC RBC AUTO-ENTMCNC: 33.3 G/DL (ref 31–37)
MCV RBC AUTO: 84.6 FL (ref 80–100)
NONHDLC SERPL-MCNC: 162 MG/DL (ref ?–130)
OSMOLALITY SERPL CALC.SUM OF ELEC: 290 MOSM/KG (ref 275–295)
PLATELET # BLD AUTO: 181 10(3)UL (ref 150–450)
POTASSIUM SERPL-SCNC: 4.1 MMOL/L (ref 3.5–5.1)
RBC # BLD AUTO: 5.72 X10(6)UL (ref 4.3–5.7)
SODIUM SERPL-SCNC: 140 MMOL/L (ref 136–145)
TRIGL SERPL-MCNC: 174 MG/DL (ref 30–149)
WBC # BLD AUTO: 6.2 X10(3) UL (ref 4–11)

## 2019-02-16 PROCEDURE — 80053 COMPREHEN METABOLIC PANEL: CPT

## 2019-02-16 PROCEDURE — 36415 COLL VENOUS BLD VENIPUNCTURE: CPT

## 2019-02-16 PROCEDURE — 80061 LIPID PANEL: CPT

## 2019-02-16 PROCEDURE — 85027 COMPLETE CBC AUTOMATED: CPT

## 2019-12-05 ENCOUNTER — OFFICE VISIT (OUTPATIENT)
Dept: FAMILY MEDICINE CLINIC | Facility: CLINIC | Age: 49
End: 2019-12-05
Payer: COMMERCIAL

## 2019-12-05 VITALS
HEART RATE: 89 BPM | BODY MASS INDEX: 39.5 KG/M2 | OXYGEN SATURATION: 96 % | TEMPERATURE: 99 F | SYSTOLIC BLOOD PRESSURE: 127 MMHG | WEIGHT: 279 LBS | DIASTOLIC BLOOD PRESSURE: 83 MMHG | HEIGHT: 70.5 IN

## 2019-12-05 DIAGNOSIS — J06.9 ACUTE UPPER RESPIRATORY INFECTION: Primary | ICD-10-CM

## 2019-12-05 PROCEDURE — 99213 OFFICE O/P EST LOW 20 MIN: CPT | Performed by: FAMILY MEDICINE

## 2019-12-05 RX ORDER — DOXYCYCLINE HYCLATE 100 MG/1
100 CAPSULE ORAL 2 TIMES DAILY
Qty: 14 CAPSULE | Refills: 0 | Status: SHIPPED | OUTPATIENT
Start: 2019-12-05 | End: 2020-10-13

## 2019-12-05 RX ORDER — LISINOPRIL 10 MG/1
10 TABLET ORAL
Qty: 90 TABLET | Refills: 3 | Status: CANCELLED | OUTPATIENT
Start: 2019-12-05

## 2019-12-05 RX ORDER — CODEINE PHOSPHATE AND GUAIFENESIN 10; 100 MG/5ML; MG/5ML
10 SOLUTION ORAL NIGHTLY
Qty: 100 ML | Refills: 0 | Status: SHIPPED | OUTPATIENT
Start: 2019-12-05 | End: 2020-10-13

## 2019-12-05 NOTE — PROGRESS NOTES
HPI:    Patient ID: Christin Rodriguez is a 51year old male. HPI  Patient presents with:  Chest Congestion: chest congestion, sore throat, bilateral ear pain  x 1 week     Review of Systems   Constitutional: Negative.     HENT: Positive for ear pain and sore

## 2020-10-13 ENCOUNTER — OFFICE VISIT (OUTPATIENT)
Dept: FAMILY MEDICINE CLINIC | Facility: CLINIC | Age: 50
End: 2020-10-13
Payer: COMMERCIAL

## 2020-10-13 VITALS
BODY MASS INDEX: 39.64 KG/M2 | HEIGHT: 70.3 IN | TEMPERATURE: 98 F | HEART RATE: 80 BPM | SYSTOLIC BLOOD PRESSURE: 131 MMHG | WEIGHT: 280 LBS | DIASTOLIC BLOOD PRESSURE: 86 MMHG | RESPIRATION RATE: 16 BRPM

## 2020-10-13 DIAGNOSIS — Z00.00 ROUTINE PHYSICAL EXAMINATION: ICD-10-CM

## 2020-10-13 DIAGNOSIS — I10 ESSENTIAL HYPERTENSION: ICD-10-CM

## 2020-10-13 PROCEDURE — 3079F DIAST BP 80-89 MM HG: CPT | Performed by: FAMILY MEDICINE

## 2020-10-13 PROCEDURE — 99396 PREV VISIT EST AGE 40-64: CPT | Performed by: FAMILY MEDICINE

## 2020-10-13 PROCEDURE — 3075F SYST BP GE 130 - 139MM HG: CPT | Performed by: FAMILY MEDICINE

## 2020-10-13 PROCEDURE — 3008F BODY MASS INDEX DOCD: CPT | Performed by: FAMILY MEDICINE

## 2020-10-13 RX ORDER — LISINOPRIL 10 MG/1
10 TABLET ORAL
Qty: 90 TABLET | Refills: 3 | Status: SHIPPED | OUTPATIENT
Start: 2020-10-13

## 2020-10-13 NOTE — PROGRESS NOTES
HPI:    Patient ID: Moustapha Jose is a 53year old male. Patient is here for routine physical exam. No acute issues. Patient is requesting blood testing. Diet and exercise have been fair.  Past medical history, family history, and social history were revi thyromegaly present. Cardiovascular: Normal rate, regular rhythm, normal heart sounds and intact distal pulses. Pulmonary/Chest: Effort normal and breath sounds normal.   Abdominal: Soft. Bowel sounds are normal. He exhibits no distension.  There is no

## 2020-10-21 ENCOUNTER — LAB ENCOUNTER (OUTPATIENT)
Dept: LAB | Age: 50
End: 2020-10-21
Attending: FAMILY MEDICINE
Payer: COMMERCIAL

## 2020-10-21 DIAGNOSIS — Z00.00 ROUTINE PHYSICAL EXAMINATION: ICD-10-CM

## 2020-10-21 PROCEDURE — 85027 COMPLETE CBC AUTOMATED: CPT

## 2020-10-21 PROCEDURE — 80053 COMPREHEN METABOLIC PANEL: CPT

## 2020-10-21 PROCEDURE — 36415 COLL VENOUS BLD VENIPUNCTURE: CPT

## 2020-10-21 PROCEDURE — 80061 LIPID PANEL: CPT

## 2022-04-15 ENCOUNTER — OFFICE VISIT (OUTPATIENT)
Dept: FAMILY MEDICINE CLINIC | Facility: CLINIC | Age: 52
End: 2022-04-15
Payer: COMMERCIAL

## 2022-04-15 ENCOUNTER — LAB ENCOUNTER (OUTPATIENT)
Dept: LAB | Age: 52
End: 2022-04-15
Payer: COMMERCIAL

## 2022-04-15 VITALS
BODY MASS INDEX: 40.01 KG/M2 | WEIGHT: 282.63 LBS | HEART RATE: 72 BPM | TEMPERATURE: 98 F | SYSTOLIC BLOOD PRESSURE: 125 MMHG | DIASTOLIC BLOOD PRESSURE: 85 MMHG | RESPIRATION RATE: 18 BRPM | HEIGHT: 70.5 IN

## 2022-04-15 DIAGNOSIS — Z00.00 WELL ADULT HEALTH CHECK: ICD-10-CM

## 2022-04-15 DIAGNOSIS — Z00.00 WELL ADULT HEALTH CHECK: Primary | ICD-10-CM

## 2022-04-15 DIAGNOSIS — Z12.11 COLON CANCER SCREENING: ICD-10-CM

## 2022-04-15 DIAGNOSIS — Z01.00 EYE EXAM, ROUTINE: ICD-10-CM

## 2022-04-15 LAB
ALBUMIN SERPL-MCNC: 4.2 G/DL (ref 3.4–5)
ALBUMIN/GLOB SERPL: 1.3 {RATIO} (ref 1–2)
ALP LIVER SERPL-CCNC: 73 U/L
ALT SERPL-CCNC: 50 U/L
ANION GAP SERPL CALC-SCNC: 7 MMOL/L (ref 0–18)
AST SERPL-CCNC: 20 U/L (ref 15–37)
BASOPHILS # BLD AUTO: 0.01 X10(3) UL (ref 0–0.2)
BASOPHILS NFR BLD AUTO: 0.2 %
BILIRUB SERPL-MCNC: 0.6 MG/DL (ref 0.1–2)
BUN BLD-MCNC: 13 MG/DL (ref 7–18)
BUN/CREAT SERPL: 13.1 (ref 10–20)
CALCIUM BLD-MCNC: 9.6 MG/DL (ref 8.5–10.1)
CHLORIDE SERPL-SCNC: 107 MMOL/L (ref 98–112)
CHOLEST SERPL-MCNC: 198 MG/DL (ref ?–200)
CO2 SERPL-SCNC: 29 MMOL/L (ref 21–32)
COMPLEXED PSA SERPL-MCNC: 1.13 NG/ML (ref ?–4)
CREAT BLD-MCNC: 0.99 MG/DL
DEPRECATED RDW RBC AUTO: 38.1 FL (ref 35.1–46.3)
EOSINOPHIL # BLD AUTO: 0.11 X10(3) UL (ref 0–0.7)
EOSINOPHIL NFR BLD AUTO: 2.1 %
ERYTHROCYTE [DISTWIDTH] IN BLOOD BY AUTOMATED COUNT: 12.6 % (ref 11–15)
FASTING PATIENT LIPID ANSWER: YES
FASTING STATUS PATIENT QL REPORTED: YES
GLOBULIN PLAS-MCNC: 3.2 G/DL (ref 2.8–4.4)
GLUCOSE BLD-MCNC: 101 MG/DL (ref 70–99)
HCT VFR BLD AUTO: 45.9 %
HDLC SERPL-MCNC: 50 MG/DL (ref 40–59)
HGB BLD-MCNC: 16 G/DL
IMM GRANULOCYTES # BLD AUTO: 0.02 X10(3) UL (ref 0–1)
IMM GRANULOCYTES NFR BLD: 0.4 %
LDLC SERPL CALC-MCNC: 132 MG/DL (ref ?–100)
LYMPHOCYTES # BLD AUTO: 1.62 X10(3) UL (ref 1–4)
LYMPHOCYTES NFR BLD AUTO: 31.5 %
MCH RBC QN AUTO: 29 PG (ref 26–34)
MCHC RBC AUTO-ENTMCNC: 34.9 G/DL (ref 31–37)
MCV RBC AUTO: 83.2 FL
MONOCYTES # BLD AUTO: 0.44 X10(3) UL (ref 0.1–1)
MONOCYTES NFR BLD AUTO: 8.5 %
NEUTROPHILS # BLD AUTO: 2.95 X10 (3) UL (ref 1.5–7.7)
NEUTROPHILS # BLD AUTO: 2.95 X10(3) UL (ref 1.5–7.7)
NEUTROPHILS NFR BLD AUTO: 57.3 %
NONHDLC SERPL-MCNC: 148 MG/DL (ref ?–130)
OSMOLALITY SERPL CALC.SUM OF ELEC: 296 MOSM/KG (ref 275–295)
PLATELET # BLD AUTO: 202 10(3)UL (ref 150–450)
POTASSIUM SERPL-SCNC: 4 MMOL/L (ref 3.5–5.1)
PROT SERPL-MCNC: 7.4 G/DL (ref 6.4–8.2)
RBC # BLD AUTO: 5.52 X10(6)UL
SODIUM SERPL-SCNC: 143 MMOL/L (ref 136–145)
TRIGL SERPL-MCNC: 89 MG/DL (ref 30–149)
VLDLC SERPL CALC-MCNC: 16 MG/DL (ref 0–30)
WBC # BLD AUTO: 5.2 X10(3) UL (ref 4–11)

## 2022-04-15 PROCEDURE — 80061 LIPID PANEL: CPT

## 2022-04-15 PROCEDURE — 99386 PREV VISIT NEW AGE 40-64: CPT

## 2022-04-15 PROCEDURE — 3008F BODY MASS INDEX DOCD: CPT

## 2022-04-15 PROCEDURE — 36415 COLL VENOUS BLD VENIPUNCTURE: CPT

## 2022-04-15 PROCEDURE — 3079F DIAST BP 80-89 MM HG: CPT

## 2022-04-15 PROCEDURE — 3074F SYST BP LT 130 MM HG: CPT

## 2022-04-15 PROCEDURE — 85025 COMPLETE CBC W/AUTO DIFF WBC: CPT

## 2022-04-15 PROCEDURE — 80053 COMPREHEN METABOLIC PANEL: CPT

## 2022-04-15 RX ORDER — LISINOPRIL 10 MG/1
10 TABLET ORAL
Qty: 90 TABLET | Refills: 1 | Status: SHIPPED | OUTPATIENT
Start: 2022-04-15

## 2022-04-17 PROBLEM — Z12.11 COLON CANCER SCREENING: Status: ACTIVE | Noted: 2022-04-17

## 2022-09-20 RX ORDER — LISINOPRIL 10 MG/1
10 TABLET ORAL DAILY
Qty: 90 TABLET | Refills: 1 | Status: SHIPPED | OUTPATIENT
Start: 2022-09-20 | End: 2023-03-22

## 2022-09-20 NOTE — TELEPHONE ENCOUNTER
Refill passed per [facility] [facility] protocol.      Requested Prescriptions   Pending Prescriptions Disp Refills    LISINOPRIL 10 MG Oral Tab [Pharmacy Med Name: LISINOPRIL 10MG TABLETS] 90 tablet 1     Sig: TAKE 1 TABLET(10 MG) BY MOUTH EVERY DAY        Hypertensive Medications Protocol Passed - 9/19/2022  1:36 PM        Passed - In person appointment in the past 12 or next 3 months       Recent Outpatient Visits              5 months ago Well adult health check    [facility], [address] [place] Adie, APRN    Office Visit    1 year ago Routine physical examination    Kellen Feliciano MD    Office Visit    2 years ago Acute upper respiratory infection    [facility], [address] [place], DO    Office Visit    3 years ago Essential hypertension    [facility], [address] MD    Office Visit    4 years ago Acute non-recurrent frontal sinusitis    [facility], [address] [place] Adie, PA-C    Office Visit                 Passed - Last BP reading less than 140/90     BP Readings from Last 1 Encounters:  04/15/22 : 125/85                Passed - CMP or BMP in past 6 months     Recent Results (from the past 4392 hour(s))   COMP METABOLIC PANEL (14)    Collection Time: 04/15/22 10:32 AM   Result Value Ref Range    Glucose 101 (H) 70 - 99 mg/dL    Sodium 143 136 - 145 mmol/L    Potassium 4.0 3.5 - 5.1 mmol/L    Chloride 107 98 - 112 mmol/L    CO2 29.0 21.0 - 32.0 mmol/L    Anion Gap 7 0 - 18 mmol/L    BUN 13 7 - 18 mg/dL    Creatinine 0.99 0.70 - 1.30 mg/dL    BUN/CREA Ratio 13.1 10.0 - 20.0    Calcium, Total 9.6 8.5 - 10.1 mg/dL    Calculated Osmolality 296 (H) 275 - 295 mOsm/kg    GFR, Non- 88 >=60    GFR, -American 102 >=60    ALT 50 16 - 61 U/L    AST 20 15 - 37 U/L    Alkaline Phosphatase 73 45 - 117 U/L    Bilirubin, Total 0.6 0.1 - 2.0 mg/dL    Total Protein 7.4 6.4 - 8.2 g/dL    Albumin 4.2 3.4 - 5.0 g/dL    Globulin  3.2 2.8 - 4.4 g/dL    A/G Ratio 1.3 1.0 - 2.0    Patient Fasting for CMP? Yes      *Note: Due to a large number of results and/or encounters for the requested time period, some results have not been displayed. A complete set of results can be found in Results Review.                  Passed - In person appointment or virtual visit in the past 6 months       Recent Outpatient Visits              5 months ago [facility]    [facility], [address] Warden Sams, APRN    Office Visit    1 year ago Routine physical examination    Ryan Gee MD    Office Visit    2 years ago Acute upper respiratory infection    [facility], [address] Henretta Lehmann,     Office Visit    3 years ago Essential hypertension    Ryan Gee MD    Office Visit    4 years ago Acute non-recurrent frontal sinusitis    [facility], [address] Warden Sams, PA-C    Office Visit                 Passed - EGFRCR or GFRNAA > 50     GFR Evaluation  GFRNAA: 88 , resulted on 4/15/2022                   Recent Outpatient Visits              5 months ago [facility], [address] Warden Sams, [facility] Visit    1 year ago Routine physical examination    Ryan Gee MD    Office Visit    2 years ago Acute upper respiratory infection    [facility], [address] Henretta Lehmann,     Office Visit    3 years ago Essential hypertension    Ryan Gee MD    Office Visit    4 years ago Acute non-recurrent frontal sinusitis    [facility], [address] Warden Sams, [employer]    Office Visit

## 2023-02-16 ENCOUNTER — TELEMEDICINE (OUTPATIENT)
Dept: FAMILY MEDICINE CLINIC | Facility: CLINIC | Age: 53
End: 2023-02-16

## 2023-02-16 DIAGNOSIS — J01.90 ACUTE SINUSITIS, RECURRENCE NOT SPECIFIED, UNSPECIFIED LOCATION: Primary | ICD-10-CM

## 2023-02-16 PROCEDURE — 99213 OFFICE O/P EST LOW 20 MIN: CPT | Performed by: FAMILY MEDICINE

## 2023-02-16 RX ORDER — AMOXICILLIN 500 MG/1
500 CAPSULE ORAL 3 TIMES DAILY
Qty: 30 CAPSULE | Refills: 0 | Status: SHIPPED | OUTPATIENT
Start: 2023-02-16 | End: 2023-02-26

## 2023-04-18 ENCOUNTER — TELEMEDICINE (OUTPATIENT)
Dept: FAMILY MEDICINE CLINIC | Facility: CLINIC | Age: 53
End: 2023-04-18

## 2023-04-18 DIAGNOSIS — J02.9 SORE THROAT: Primary | ICD-10-CM

## 2023-04-18 PROCEDURE — 99213 OFFICE O/P EST LOW 20 MIN: CPT | Performed by: FAMILY MEDICINE

## 2023-04-18 RX ORDER — AMOXICILLIN 875 MG/1
875 TABLET, COATED ORAL 2 TIMES DAILY
Qty: 20 TABLET | Refills: 0 | Status: SHIPPED | OUTPATIENT
Start: 2023-04-18

## 2024-01-30 ENCOUNTER — OFFICE VISIT (OUTPATIENT)
Dept: PODIATRY CLINIC | Facility: CLINIC | Age: 54
End: 2024-01-30
Payer: COMMERCIAL

## 2024-01-30 DIAGNOSIS — B35.1 ONYCHOMYCOSIS: Primary | ICD-10-CM

## 2024-01-30 PROCEDURE — 99203 OFFICE O/P NEW LOW 30 MIN: CPT | Performed by: STUDENT IN AN ORGANIZED HEALTH CARE EDUCATION/TRAINING PROGRAM

## 2024-01-31 ENCOUNTER — LAB ENCOUNTER (OUTPATIENT)
Dept: LAB | Facility: HOSPITAL | Age: 54
End: 2024-01-31
Attending: STUDENT IN AN ORGANIZED HEALTH CARE EDUCATION/TRAINING PROGRAM
Payer: COMMERCIAL

## 2024-01-31 DIAGNOSIS — B35.1 ONYCHOMYCOSIS: ICD-10-CM

## 2024-01-31 LAB
ALBUMIN SERPL-MCNC: 4.3 G/DL (ref 3.2–4.8)
ALP LIVER SERPL-CCNC: 69 U/L
ALT SERPL-CCNC: 32 U/L
AST SERPL-CCNC: 19 U/L (ref ?–34)
BILIRUB DIRECT SERPL-MCNC: 0.2 MG/DL (ref ?–0.3)
BILIRUB SERPL-MCNC: 0.6 MG/DL (ref 0.3–1.2)
PROT SERPL-MCNC: 7 G/DL (ref 5.7–8.2)

## 2024-01-31 PROCEDURE — 80076 HEPATIC FUNCTION PANEL: CPT

## 2024-01-31 PROCEDURE — 36415 COLL VENOUS BLD VENIPUNCTURE: CPT

## 2024-01-31 RX ORDER — TERBINAFINE HYDROCHLORIDE 250 MG/1
250 TABLET ORAL DAILY
Qty: 90 TABLET | Refills: 0 | Status: SHIPPED | OUTPATIENT
Start: 2024-01-31 | End: 2024-04-30

## 2024-01-31 NOTE — PROGRESS NOTES
[facility]  Progress Note      Raul Carrillo is a 53 year old male.   Chief Complaint   Patient presents with    Toenail Care     Consult - left foot, brittle toenails, has tried multiple OTC remedies to no avail - denies pain             HPI:   Pt is a pleasant 53 year old male who PTC for evaluation of discolored and thickened left foot toenails. Pt has tried multiple OTC remedies with no improvement in symptoms       Allergies: Patient has no known allergies.    Current Outpatient Medications   Medication Sig Dispense Refill    terbinafine 250 MG Oral Tab Take 1 tablet (250 mg total) by mouth daily. 90 tablet 0    lisinopril 10 MG Oral Tab Take 1 tablet (10 mg total) by mouth daily. 90 tablet 3    amoxicillin 875 MG Oral Tab Take 1 tablet (875 mg total) by mouth 2 (two) times daily. (Patient not taking: Reported on 1/30/2024) 20 tablet 0      Past Medical History:   Diagnosis Date    Essential hypertension       Past Surgical History:   Procedure Laterality Date    HEAD SURGERY PROC UNLISTED        Family History   Problem Relation Age of Onset    Hypertension Father     Heart Disorder Father     Hypertension Mother     Diabetes Maternal Grandfather       Social History     Socioeconomic History    Marital status:    Tobacco Use    Smoking status: Never    Smokeless tobacco: Never   Vaping Use    Vaping Use: Never used   Substance and Sexual Activity    Alcohol use: No     Alcohol/week: 0.0 standard drinks of alcohol    Drug use: No           REVIEW OF SYSTEMS:     Denies nause, fever, chills  No calf pain  Denies chest pain or SOB      EXAM:   There were no vitals taken for this visit.  GENERAL: well developed, well nourished, in no apparent distress  EXTREMITIES:   1. Integument: Normal skin temperature and turgor. Toenails x 5 of left foot and discolored, thickened with subungal debri.   2. Vascular: Dorsalis pedis two out of four bilateral and posterior tibial pulses two out of   four  bilateral, capillary refill normal.   3. Musculoskeletal: All muscle groups are graded 5 out of 5 in the foot and ankle.   4. Neurological: Normal sharp dull sensation; reflexes normal.             ASSESSMENT AND PLAN:   Diagnoses and all orders for this visit:    Onychomycosis  -     Hepatic Function Panel (7); Future    Other orders  -     terbinafine 250 MG Oral Tab; Take 1 tablet (250 mg total) by mouth daily.        Plan:     Reviewed treatment options for nail dystrophy / onychomycosis including:   No treatment and monitoring, topical meds, oral meds, nail removal and laser treatment.  Advantages and disadvantages of each reviewed. Using oral agents would require regular   blood test monitoring due to risk of hepatotoxicity and other adverse reactions including drug interactions.   Topical meds are much safer yet carry very low efficacy.  Pt has opted for oral treatment  Hepatic labs obtained and within normal limits  Rx for oral terbinafine to be taken as directed  Repeat hepatic labs in 45 days      The patient indicates understanding of these issues and agrees to the plan.        Cristina Hernández DPM

## 2024-02-29 ENCOUNTER — TELEPHONE (OUTPATIENT)
Dept: PODIATRY CLINIC | Facility: CLINIC | Age: 54
End: 2024-02-29

## 2024-02-29 RX ORDER — CICLOPIROX 80 MG/ML
SOLUTION TOPICAL
Qty: 1 EACH | Refills: 3 | Status: SHIPPED | OUTPATIENT
Start: 2024-02-29

## 2024-02-29 NOTE — TELEPHONE ENCOUNTER
No need for new hepatic since patient discontinue medication and was for short amount of time. I can prescribe a topical, it won't be as effective but no side effects

## 2024-02-29 NOTE — TELEPHONE ENCOUNTER
Pt called stating pt stopped taking the rx. On Monday 2-26-24 due to abdominal pain.  Please call pt

## 2024-02-29 NOTE — TELEPHONE ENCOUNTER
S/w patient- patient states that he started lamisil at the beginning of the month. At the 5 day genet of taking the medication, he states that he started to feel a dull abdominal pain. He states that it was tolerable, but is has persisted while being on the medication. He states that it has gotten slightly worse of the last week and he decided to stop the medication. I informed him that was a good idea as this medication is the only change he has made before feeling this discomfort. He stopped taking the medication on Monday and has since had improvement in sytmptoms. He is wondering if there are any topical options her can use now that he is not taking the oral terbinafine. He is also wondering if he should take the repeat hepatic test or if that is even necessary now that he is not taking the medication anymore.     Please advise

## 2024-02-29 NOTE — TELEPHONE ENCOUNTER
S/w patient- Informed him of ciclopirox nail solution. I did inform him to reach out to PCP if abdominal pain were to get worse or if it were to persist.

## 2024-03-05 ENCOUNTER — TELEPHONE (OUTPATIENT)
Dept: PODIATRY CLINIC | Facility: CLINIC | Age: 54
End: 2024-03-05

## 2024-03-05 NOTE — TELEPHONE ENCOUNTER
Called pharmacy, they stated Ciclopirox is not covered and needs PA.    Spoke with Angie at [facility] and she stated she would be faxing a form for us to start PA

## 2024-03-05 NOTE — TELEPHONE ENCOUNTER
Went to pharmacy to  prescription but per pharmacist they need an over write  to get prescription.Please advise

## 2024-03-06 RX ORDER — CICLOPIROX 80 MG/ML
SOLUTION TOPICAL
Qty: 6.6 ML | Refills: 0 | OUTPATIENT
Start: 2024-03-06

## 2024-03-06 NOTE — TELEPHONE ENCOUNTER
Rc'd form from [employer] and completed PA form and faxed to [employer] at [phone].   Pt notified that PA may take several days and if he doesn't hear back from office he can check with pharmacy in 2-3 days to see if it was approved. He verbalized understanding.

## 2024-03-12 ENCOUNTER — OFFICE VISIT (OUTPATIENT)
Dept: FAMILY MEDICINE CLINIC | Facility: CLINIC | Age: 54
End: 2024-03-12

## 2024-03-12 ENCOUNTER — NURSE TRIAGE (OUTPATIENT)
Dept: FAMILY MEDICINE CLINIC | Facility: CLINIC | Age: 54
End: 2024-03-12

## 2024-03-12 VITALS
HEIGHT: 70.5 IN | WEIGHT: 290 LBS | RESPIRATION RATE: 16 BRPM | HEART RATE: 66 BPM | DIASTOLIC BLOOD PRESSURE: 83 MMHG | TEMPERATURE: 99 F | SYSTOLIC BLOOD PRESSURE: 132 MMHG | BODY MASS INDEX: 41.05 KG/M2

## 2024-03-12 DIAGNOSIS — R10.31 RIGHT INGUINAL PAIN: Primary | ICD-10-CM

## 2024-03-12 DIAGNOSIS — Z00.00 ROUTINE PHYSICAL EXAMINATION: ICD-10-CM

## 2024-03-12 PROCEDURE — 3075F SYST BP GE 130 - 139MM HG: CPT | Performed by: FAMILY MEDICINE

## 2024-03-12 PROCEDURE — 99213 OFFICE O/P EST LOW 20 MIN: CPT | Performed by: FAMILY MEDICINE

## 2024-03-12 PROCEDURE — 3008F BODY MASS INDEX DOCD: CPT | Performed by: FAMILY MEDICINE

## 2024-03-12 PROCEDURE — 3079F DIAST BP 80-89 MM HG: CPT | Performed by: FAMILY MEDICINE

## 2024-03-12 RX ORDER — LISINOPRIL 10 MG/1
10 TABLET ORAL DAILY
Qty: 90 TABLET | Refills: 3 | Status: SHIPPED | OUTPATIENT
Start: 2024-03-12

## 2024-03-12 NOTE — PROGRESS NOTES
Subjective:   Patient ID: Raul Carrillo is a 53 year old male.    Pt presents with hx of toenail fungus and was treated with lamisil and then started to have pains of the right lower abdomen/ groin area 6 weeks ago.  Pt stopped lamisil and thought pains were better.  No trauma or injury. No illness. No fevers.   Was doing some work in basement several weeks ago.         History/Other:   Review of Systems   Constitutional:  Negative for fever.   Cardiovascular:  Negative for chest pain.   Gastrointestinal:  Negative for abdominal pain.   Genitourinary:  Negative for flank pain.   Psychiatric/Behavioral:  Negative for dysphoric mood. The patient is not hyperactive.      Current Outpatient Medications   Medication Sig Dispense Refill    lisinopril 10 MG Oral Tab Take 1 tablet (10 mg total) by mouth daily. 90 tablet 3    Ciclopirox 8 % External Solution Apply to affected toenails once daily (Patient not taking: Reported on 3/12/2024) 1 each 3    terbinafine 250 MG Oral Tab Take 1 tablet (250 mg total) by mouth daily. (Patient not taking: Reported on 3/12/2024) 90 tablet 0    amoxicillin 875 MG Oral Tab Take 1 tablet (875 mg total) by mouth 2 (two) times daily. (Patient not taking: Reported on 1/30/2024) 20 tablet 0     Allergies:No Known Allergies    Objective:   Physical Exam  Constitutional:       Appearance: Normal appearance.   Abdominal:      General: Abdomen is flat.      Palpations: There is no mass.      Tenderness: There is no abdominal tenderness. There is no right CVA tenderness, left CVA tenderness, guarding or rebound.      Hernia: No hernia is present.   Neurological:      Mental Status: He is alert.         Assessment & Plan:   1. Right inguinal pain: Exam unremarkable: no hernia noted:  ? Strain:   - After discussion with patient, to monitor for symptoms and call if any significant symptoms; Consider follow up with general surgery if not better       2.      Routine physical:           - Ordered lab work  as discussed; can follow up for physical after testing.     No orders of the defined types were placed in this encounter.      Meds This Visit:  Requested Prescriptions      No prescriptions requested or ordered in this encounter       Imaging & Referrals:  None

## 2024-03-12 NOTE — TELEPHONE ENCOUNTER
Action Requested: Summary for Provider     []  Critical Lab, Recommendations Needed  [] Need Additional Advice  [x]   FYI    []   Need Orders  [] Need Medications Sent to Pharmacy  []  Other     SUMMARY: Per protocol, patient should be seen in the office today. Appointment scheduled.    Future Appointments   Date Time Provider Department Center   3/12/2024  2:40 PM Bin Christina MD [facility] ZE[facility]AN [facility]     Reason for call: Abdominal Pain  Onset: 1 Month Ago    Patient reports of intermittent pain in the right side, belt line, hip area. Certain movements such as moving a certain way such as reaching for something on the side elicits a sharp pain that goes away on its own. It is not crippling pain but more of constant discomfort. This has been ongoing for a month now.    Reports he saw Dr. Hernández back in January for toenail care and was prescribed terbinafine. He initially thought the medication was causing the pain, as his right side started hurting on day #5 of taking the medication. Became progressively worse by week #3 and was taking Tylenol to offset the pain. He finally stopped taking the medication approximately 2 weeks ago but he remains to have pain. Denies any other complaints.     Advised to schedule an appointment for evaluation. Care advice given per protocol. Patient verbalized understanding and agreed with plan of care.     Reason for Disposition   Patient wants to be seen    Protocols used: Abdominal Pain - Male-A-[place]

## 2024-03-16 ENCOUNTER — LAB ENCOUNTER (OUTPATIENT)
Dept: LAB | Facility: HOSPITAL | Age: 54
End: 2024-03-16
Attending: FAMILY MEDICINE
Payer: COMMERCIAL

## 2024-03-16 DIAGNOSIS — Z00.00 ROUTINE PHYSICAL EXAMINATION: ICD-10-CM

## 2024-03-16 LAB
ALBUMIN SERPL-MCNC: 4.6 G/DL (ref 3.2–4.8)
ALBUMIN/GLOB SERPL: 1.6 {RATIO} (ref 1–2)
ALP LIVER SERPL-CCNC: 67 U/L
ALT SERPL-CCNC: 27 U/L
ANION GAP SERPL CALC-SCNC: 6 MMOL/L (ref 0–18)
AST SERPL-CCNC: 17 U/L (ref ?–34)
BILIRUB SERPL-MCNC: 0.7 MG/DL (ref 0.3–1.2)
BUN BLD-MCNC: 12 MG/DL (ref 9–23)
BUN/CREAT SERPL: 11.3 (ref 10–20)
CALCIUM BLD-MCNC: 9.6 MG/DL (ref 8.7–10.4)
CHLORIDE SERPL-SCNC: 107 MMOL/L (ref 98–112)
CHOLEST SERPL-MCNC: 212 MG/DL (ref ?–200)
CO2 SERPL-SCNC: 28 MMOL/L (ref 21–32)
COMPLEXED PSA SERPL-MCNC: 0.76 NG/ML (ref ?–4)
CREAT BLD-MCNC: 1.06 MG/DL
DEPRECATED RDW RBC AUTO: 36.3 FL (ref 35.1–46.3)
EGFRCR SERPLBLD CKD-EPI 2021: 84 ML/MIN/1.73M2 (ref 60–?)
ERYTHROCYTE [DISTWIDTH] IN BLOOD BY AUTOMATED COUNT: 13 % (ref 11–15)
FASTING PATIENT LIPID ANSWER: YES
FASTING STATUS PATIENT QL REPORTED: YES
GLOBULIN PLAS-MCNC: 2.8 G/DL (ref 2.8–4.4)
GLUCOSE BLD-MCNC: 121 MG/DL (ref 70–99)
HCT VFR BLD AUTO: 46 %
HDLC SERPL-MCNC: 47 MG/DL (ref 40–59)
HGB BLD-MCNC: 16 G/DL
LDLC SERPL CALC-MCNC: 148 MG/DL (ref ?–100)
MCH RBC QN AUTO: 27.7 PG (ref 26–34)
MCHC RBC AUTO-ENTMCNC: 34.8 G/DL (ref 31–37)
MCV RBC AUTO: 79.7 FL
NONHDLC SERPL-MCNC: 165 MG/DL (ref ?–130)
OSMOLALITY SERPL CALC.SUM OF ELEC: 293 MOSM/KG (ref 275–295)
PLATELET # BLD AUTO: 216 10(3)UL (ref 150–450)
POTASSIUM SERPL-SCNC: 3.9 MMOL/L (ref 3.5–5.1)
PROT SERPL-MCNC: 7.4 G/DL (ref 5.7–8.2)
RBC # BLD AUTO: 5.77 X10(6)UL
SODIUM SERPL-SCNC: 141 MMOL/L (ref 136–145)
TRIGL SERPL-MCNC: 96 MG/DL (ref 30–149)
VLDLC SERPL CALC-MCNC: 18 MG/DL (ref 0–30)
WBC # BLD AUTO: 6.4 X10(3) UL (ref 4–11)

## 2024-03-16 PROCEDURE — 36415 COLL VENOUS BLD VENIPUNCTURE: CPT

## 2024-03-16 PROCEDURE — 80061 LIPID PANEL: CPT

## 2024-03-16 PROCEDURE — 85027 COMPLETE CBC AUTOMATED: CPT

## 2024-03-16 PROCEDURE — 80053 COMPREHEN METABOLIC PANEL: CPT

## 2024-04-03 ENCOUNTER — TELEPHONE (OUTPATIENT)
Dept: PODIATRY CLINIC | Facility: CLINIC | Age: 54
End: 2024-04-03

## 2024-04-03 NOTE — TELEPHONE ENCOUNTER
Called pt and informed him rx for med was sent to pharmacy on 2/29/24 and PA was submitted to insurance on 3/6/24 but it was denied and read him the denial rationale that was scanned in. I did advise him that this medication is often denied by insurance. I advised he can check with pharmacy to see if discounts/coupons available for self pay price. Pt was unable to tolerate oral lamisil as it caused abdominal pain.   Pt looking for further recommendations on topical tx's?

## 2024-04-03 NOTE — TELEPHONE ENCOUNTER
Patient states they called over a month for this medication refill. Isn't certain why is hasn't been sent. Please advise.        - Ciclopirox 8 % External Solution [Pharmacy Med Name: CICLOPIROX 8% NAIL LACQUER TOP SOLN]

## 2024-04-05 RX ORDER — CLOTRIMAZOLE 1 G/ML
1 SOLUTION TOPICAL 2 TIMES DAILY
Qty: 60 ML | Refills: 0 | Status: SHIPPED | OUTPATIENT
Start: 2024-04-05

## 2025-02-12 ENCOUNTER — TELEMEDICINE (OUTPATIENT)
Dept: FAMILY MEDICINE CLINIC | Facility: CLINIC | Age: 55
End: 2025-02-12

## 2025-02-12 DIAGNOSIS — J01.90 ACUTE SINUSITIS, RECURRENCE NOT SPECIFIED, UNSPECIFIED LOCATION: Primary | ICD-10-CM

## 2025-02-12 PROCEDURE — 98004 SYNCH AUDIO-VIDEO EST SF 10: CPT | Performed by: FAMILY MEDICINE

## 2025-02-12 NOTE — PROGRESS NOTES
Subjective:   Patient ID: Raul Carrillo is a 54 year old male.    This visit is conducted using Telemedicine with live, interactive video and audio during this Coronavirus pandemic.    Please note that the following visit was completed using two-way, real-time interactive audio and/or video communication.  This has been done in good santana to provide continuity of care in the best interest of the provider-patient relationship, due to the ongoing public health crisis/national emergency and because of restrictions of visitation.  There are limitations of this visit as no physical exam could be performed.  Every conscious effort was taken to allow for sufficient and adequate time.  This billing was spent on reviewing labs, medications, radiology tests and decision making.  Appropriate medical decision-making and tests are ordered as detailed in the plan of care above    Virtual Telephone Check-In    Raul Carrillo verbally consents to a Virtual/Telephone Check-In visit on 02/12/25.  Patient has been referred to the [facility] website at [email or web address] to review the yearly Consent to Treat document.    Patient understands and accepts financial responsibility for any deductible, co-insurance and/or co-pays associated with this service.    Duration of the service: 10 minutes      Summary of topics discussed: cold / sinus symptoms    Pt presents with sinus symptoms for several days. Pt had a cold but was better but now has had post nasal drainage, congestion, sinus pressure and drainage. Some sore throat and fevers. Pt has tried otc remedies without relief. Pt thinks he has sinus infection now.    Bin Christina MD                  History/Other:   Review of Systems   HENT:  Positive for congestion, postnasal drip, rhinorrhea, sinus pressure, sinus pain and sore throat. Negative for ear pain.    Respiratory:  Negative for cough.      Current Outpatient Medications   Medication Sig Dispense Refill    amoxicillin clavulanate  875-125 MG Oral Tab Take 1 tablet by mouth 2 (two) times daily for 10 days. 20 tablet 0    clotrimazole 1 % External Solution Apply 1 Application topically 2 (two) times daily. 60 mL 0    lisinopril 10 MG Oral Tab Take 1 tablet (10 mg total) by mouth daily. 90 tablet 3    Ciclopirox 8 % External Solution Apply to affected toenails once daily (Patient not taking: Reported on 3/12/2024) 1 each 3    amoxicillin 875 MG Oral Tab Take 1 tablet (875 mg total) by mouth 2 (two) times daily. (Patient not taking: Reported on 1/30/2024) 20 tablet 0     Allergies:Allergies[1]    Objective:   Physical Exam  Constitutional:       Appearance: Normal appearance.   Pulmonary:      Effort: Pulmonary effort is normal.   Neurological:      Mental Status: He is alert.         Assessment & Plan:   1. Acute sinusitis, recurrence not specified, unspecified location:  - After discussion with patient, augmentin as directed; Over the counter remedies discussed; steam/ fluids discussed; To call if worse or not better; Follow up in one week if not resolved or as needed if worse         No orders of the defined types were placed in this encounter.      Meds This Visit:  Requested Prescriptions     Signed Prescriptions Disp Refills    amoxicillin clavulanate 875-125 MG Oral Tab 20 tablet 0     Sig: Take 1 tablet by mouth 2 (two) times daily for 10 days.       Imaging & Referrals:  None         [1] No Known Allergies

## 2025-04-01 RX ORDER — LISINOPRIL 10 MG/1
10 TABLET ORAL DAILY
Qty: 90 TABLET | Refills: 3 | Status: SHIPPED | OUTPATIENT
Start: 2025-04-01

## 2025-04-01 NOTE — TELEPHONE ENCOUNTER
Message noted: Chart reviewed and may refill medication as requested. Prescription sent to listed pharmacy. Pharmacy to notify patient. Pt notified through [facility]

## 2025-06-25 ENCOUNTER — EKG ENCOUNTER (OUTPATIENT)
Dept: LAB | Facility: HOSPITAL | Age: 55
End: 2025-06-25
Attending: INTERNAL MEDICINE
Payer: COMMERCIAL

## 2025-06-25 ENCOUNTER — OFFICE VISIT (OUTPATIENT)
Dept: SURGERY | Facility: CLINIC | Age: 55
End: 2025-06-25
Payer: COMMERCIAL

## 2025-06-25 VITALS
BODY MASS INDEX: 41.52 KG/M2 | OXYGEN SATURATION: 97 % | SYSTOLIC BLOOD PRESSURE: 130 MMHG | HEIGHT: 70.2 IN | DIASTOLIC BLOOD PRESSURE: 84 MMHG | HEART RATE: 86 BPM | WEIGHT: 290 LBS | RESPIRATION RATE: 16 BRPM

## 2025-06-25 DIAGNOSIS — I10 HTN (HYPERTENSION), BENIGN: ICD-10-CM

## 2025-06-25 DIAGNOSIS — E88.819 INSULIN RESISTANCE: ICD-10-CM

## 2025-06-25 DIAGNOSIS — R53.82 CHRONIC FATIGUE: ICD-10-CM

## 2025-06-25 DIAGNOSIS — E66.01 MORBID OBESITY WITH BMI OF 40.0-44.9, ADULT (HCC): ICD-10-CM

## 2025-06-25 DIAGNOSIS — E55.9 VITAMIN D DEFICIENCY: ICD-10-CM

## 2025-06-25 DIAGNOSIS — E78.5 DYSLIPIDEMIA: Primary | ICD-10-CM

## 2025-06-25 DIAGNOSIS — R73.09 ABNORMAL BLOOD SUGAR: ICD-10-CM

## 2025-06-25 DIAGNOSIS — E78.5 DYSLIPIDEMIA: ICD-10-CM

## 2025-06-25 LAB
EST. AVERAGE GLUCOSE BLD GHB EST-MCNC: 97 MG/DL (ref 68–126)
HBA1C MFR BLD: 5 % (ref ?–5.7)
VIT B12 SERPL-MCNC: 376 PG/ML (ref 211–911)
VIT D+METAB SERPL-MCNC: 24.8 NG/ML (ref 30–100)

## 2025-06-25 PROCEDURE — 83036 HEMOGLOBIN GLYCOSYLATED A1C: CPT | Performed by: INTERNAL MEDICINE

## 2025-06-25 PROCEDURE — 93010 ELECTROCARDIOGRAM REPORT: CPT | Performed by: INTERNAL MEDICINE

## 2025-06-25 PROCEDURE — 3008F BODY MASS INDEX DOCD: CPT | Performed by: INTERNAL MEDICINE

## 2025-06-25 PROCEDURE — 93005 ELECTROCARDIOGRAM TRACING: CPT

## 2025-06-25 PROCEDURE — 82306 VITAMIN D 25 HYDROXY: CPT | Performed by: INTERNAL MEDICINE

## 2025-06-25 PROCEDURE — 3079F DIAST BP 80-89 MM HG: CPT | Performed by: INTERNAL MEDICINE

## 2025-06-25 PROCEDURE — 82607 VITAMIN B-12: CPT | Performed by: INTERNAL MEDICINE

## 2025-06-25 PROCEDURE — 99205 OFFICE O/P NEW HI 60 MIN: CPT | Performed by: INTERNAL MEDICINE

## 2025-06-25 PROCEDURE — 3075F SYST BP GE 130 - 139MM HG: CPT | Performed by: INTERNAL MEDICINE

## 2025-06-25 RX ORDER — PHENTERMINE HYDROCHLORIDE 15 MG/1
15 CAPSULE ORAL EVERY MORNING
Qty: 30 CAPSULE | Refills: 5 | Status: SHIPPED | OUTPATIENT
Start: 2025-06-25 | End: 2025-06-27

## 2025-06-25 NOTE — PROGRESS NOTES
[facility] Consultation Note       Patient:  Raul Carrillo  :      1970  MRN:      [id number]    Referring Provider: Dr. Christina       Chief Complaint:    Chief Complaint   Patient presents with    Consult    Weight Management       SUBJECTIVE     History of Present Illness:  Raul Carrillo has been referred to me for evaluation and treatment.       55 yo who lives with family  Wife does the cooking  Currently at heaviest weight   [place]    Patient has tried several diets in the past including exercises and is frustrated with increase of weight. Weight has been a struggle for the past several years and is now starting to develop into co-morbidities that are worrisome to the patient. Patient is interested in losing weight, so it can stay off long term.    Patient also understands that this is a life style change and wants to get on track.    Interested in non surgical weight loss    Past Medical History: Past Medical History[1]    OBJECTIVE     Vitals: /84   Pulse 86   Resp 16   Ht 5' 10.2\" (1.783 m)   Wt 290 lb (131.5 kg)   SpO2 97%   BMI 41.37 kg/m²      Patient Medications:  Current Medications[2]    Allergies:  Patient has no known allergies.     Comorbidities:  hypertension    Social History:    Social History     Socioeconomic History    Marital status:      Spouse name: Not on file    Number of children: Not on file    Years of education: Not on file    Highest education level: Not on file   Occupational History    Not on file   Tobacco Use    Smoking status: Never    Smokeless tobacco: Never   Vaping Use    Vaping status: Never Used   Substance and Sexual Activity    Alcohol use: No     Alcohol/week: 0.0 standard drinks of alcohol    Drug use: No    Sexual activity: Not on file   Other Topics Concern    Not on file   Social History Narrative    Not on file     Social Drivers of Health     Food Insecurity: Not on file   Transportation Needs: Not on file   Stress: Not  on file   Housing Stability: Not on file     Surgical History:  Past Surgical History[3]    Family History:  Family History[4]        Typical Dietary Intake:  Breakfast AM Snack Lunch PM Snack Dinner   [employer], soda soda Left overs, FF, soda Ice cream Chicken, steak ground beef, rice, pasta     Soda Drinker?: Yes  If yes, how much?:  regular 6 cans    Number of restaurant or fast food meals/week:  5 -7 meals/week    Nutritional Goals Reviewed and Discussed:     Limit carbohydrates to 100 gms per day, Eat 100-200 calories within 1 hour of waking up, and Eat 3-4 cups of fresh fruit or vegetables daily    Behavior Modifications Reviewed and Discussed:    Eat breakfast, Eat 3 meals per day, Plan meals in advance, Read nutrition labels, Drink 64oz of water per day, Maintain a daily food journal, No drinking 30 minutes before or after meals, Utilize portion control strategies to reduce calorie intake, Identify triggers for eating and manage cues, and Eat slowly and take 20 to 30 minutes to complete each meal      ROS:  Constitutional: negative  Respiratory: negative  Cardiovascular: negative  Gastrointestinal: positive for reflux symptoms  Musculoskeletal:negative  Neurological: negative  Behavioral/Psych: negative  Endocrine: negative  All other systems were reviewed and are negative.    Physical Exam:  General appearance: alert, appears stated age, cooperative, and moderately obese  Head: Normocephalic, without obvious abnormality, atraumatic  Back: symmetric, no curvature. ROM normal. No CVA tenderness.  Lungs: clear to auscultation bilaterally  Heart: S1, S2 normal, no murmur, click, rub or gallop, regular rate and rhythm  Abdomen: soft, obese, non tender  Extremities: extremities normal, atraumatic, no cyanosis or edema  Pulses: 2+ and symmetric  Skin: Skin color, texture, turgor normal. No rashes or lesions  Neurologic: Grossly normal    ASSESSMENT     HYPERTENSION:  The patient's blood pressure has been well  controlled.  he has been checking it as instructed and has remained in relatively good control.    HYPERCHOLESTEROLEMIA:  The patient states that his cholesterol has been well controlled on his current medication.    Lab Results   Component Value Date/Time    CHOLEST 212 (H) 03/16/2024 07:50 AM     (H) 03/16/2024 07:50 AM    HDL 47 03/16/2024 07:50 AM    TRIG 96 03/16/2024 07:50 AM    VLDL 18 03/16/2024 07:50 AM     Insulin resistance      Encounter Diagnosis(ses):   1. Dyslipidemia    2. Insulin resistance    3. HTN (hypertension), benign    4. Chronic fatigue    5. Vitamin D deficiency    6. Abnormal blood sugar    7. Morbid obesity with BMI of 40.0-44.9, adult (HCC)        PLAN     Patient is not interested in bariatric surgery. Patient desires to pursue traditional weight loss at this time.      HYPERTENSION: Blood pressure stable on the above medications. No interval change in antihypertensive medication.     OBSTRUCTIVE SLEEP APNEA: Given the patient's history suggestive of obstructive sleep apnea as outlined above, consideration for obtaining a sleep study may be warranted.  Further consideration for obtaining the sleep study will be discussed with the patient's PCP.  DYSLIPIDEMIA: Stable on the above prescribed meal plan and medication. Liver function stable.    Lab Results   Component Value Date/Time    CHOLEST 212 (H) 03/16/2024 07:50 AM     (H) 03/16/2024 07:50 AM    HDL 47 03/16/2024 07:50 AM    TRIG 96 03/16/2024 07:50 AM    VLDL 18 03/16/2024 07:50 AM       Goals for next month:  1. Keep a food log.  2. Drink 48-64 ounces of non-caloric beverages per day. No fruit juices or regular soda.  3. Increase activity-upper body exercises, walk 10 minutes per day.  4. Increase fruit and vegetable servings to 5-6 per day.      PHENTERMINE: Since the patient would like to try phentermine, and is aware of the potential side effects (hypertension, palpitations, tachycardia, and anxiety), I will give  Raul Carrillo a prescription today to be used in conjunction with the above diet and exercise program. The patient will check his heart rate and blood pressure on a regular basis. He will call me if his BP goes over 140/90 or if he has palpitations or racing heart rate. He understands that I will not call in the prescription for him; he has to have an appointment to have the medication refilled.   Will start at 15 mg  EKG needed    Needs to cut out soda  Refer to pulmonary team    Diagnoses and all orders for this visit:    Dyslipidemia  -     EKG 12 Lead to be performed at [facility]; Future  -     Pulmonary Referral - In Network  -     Hemoglobin A1C; Future  -     Vitamin D, 25-Hydroxy; Future  -     Vitamin B12; Future    Insulin resistance  -     EKG 12 Lead to be performed at [facility]; Future  -     Pulmonary Referral - In Network  -     Hemoglobin A1C; Future  -     Vitamin D, 25-Hydroxy; Future  -     Vitamin B12; Future    HTN (hypertension), benign  -     EKG 12 Lead to be performed at [facility]; Future  -     Pulmonary Referral - In Network  -     Hemoglobin A1C; Future  -     Vitamin D, 25-Hydroxy; Future  -     Vitamin B12; Future    Chronic fatigue  -     EKG 12 Lead to be performed at [facility]; Future  -     Pulmonary Referral - In Network  -     Hemoglobin A1C; Future  -     Vitamin D, 25-Hydroxy; Future  -     Vitamin B12; Future    Vitamin D deficiency  -     EKG 12 Lead to be performed at [facility]; Future  -     Pulmonary Referral - In Network  -     Hemoglobin A1C; Future  -     Vitamin D, 25-Hydroxy; Future  -     Vitamin B12; Future    Abnormal blood sugar  -     EKG 12 Lead to be performed at [facility]; Future  -     Pulmonary Referral - In Network  -     Hemoglobin A1C; Future  -     Vitamin D, 25-Hydroxy; Future  -     Vitamin B12; Future    Morbid obesity with BMI of 40.0-44.9, adult  (HCC)  -     Phentermine HCl 15 MG Oral Cap; Take 1 capsule (15 mg total) by mouth every morning.        Paul Dowling MD             [1]   Past Medical History:   Essential hypertension   [2]   Current Outpatient Medications   Medication Sig Dispense Refill    Phentermine HCl 15 MG Oral Cap Take 1 capsule (15 mg total) by mouth every morning. 30 capsule 5    LISINOPRIL 10 MG Oral Tab TAKE 1 TABLET(10 MG) BY MOUTH DAILY 90 tablet 3   [3]   Past Surgical History:  Procedure Laterality Date    Head surgery proc unlisted     [4]   Family History  Problem Relation Age of Onset    Hypertension Father     Heart Disorder Father     Hypertension Mother     Diabetes Maternal Grandfather

## 2025-06-26 ENCOUNTER — TELEPHONE (OUTPATIENT)
Dept: FAMILY MEDICINE CLINIC | Facility: CLINIC | Age: 55
End: 2025-06-26

## 2025-06-26 LAB
ATRIAL RATE: 87 BPM
P AXIS: 51 DEGREES
P-R INTERVAL: 170 MS
Q-T INTERVAL: 348 MS
QRS DURATION: 88 MS
QTC CALCULATION (BEZET): 418 MS
R AXIS: 7 DEGREES
T AXIS: 44 DEGREES
VENTRICULAR RATE: 87 BPM

## 2025-06-26 NOTE — TELEPHONE ENCOUNTER
Message noted and testing was done for B12 and vitamin D which was low. Did not see any note about ? Bleeding. If actually having bleeding can go to immediate care. Can use res 24 slots if available or see another provider if need for sooner appointment as not in office Friday or this weekend.

## 2025-06-26 NOTE — TELEPHONE ENCOUNTER
The patients wife called - saying they were seen by Dr. Dowling yesterday and he said something to them about the patient having \"internal bleeding\" and \"something wrong with his red cells\" and he needs to be seen immediately by Dr. Christina. Reviewed Dr. Dowling's note - nothing in the nore regarding this. He only did three labs that did not indicate that.     Offered an appointment on 07/07 when one was available to book. The patients wife refused and wants him seen sooner.     Dr. Christina please advise, not sure if you want to discuss with Dr. Dowling, but could we book pt in a res 24 for early next week? Thank you.

## 2025-06-27 ENCOUNTER — NURSE TRIAGE (OUTPATIENT)
Dept: FAMILY MEDICINE CLINIC | Facility: CLINIC | Age: 55
End: 2025-06-27

## 2025-06-27 DIAGNOSIS — E66.01 MORBID OBESITY WITH BMI OF 40.0-44.9, ADULT (HCC): ICD-10-CM

## 2025-06-27 RX ORDER — PHENTERMINE HYDROCHLORIDE 30 MG/1
30 CAPSULE ORAL EVERY MORNING
Qty: 30 CAPSULE | Refills: 5 | Status: SHIPPED | OUTPATIENT
Start: 2025-06-27

## 2025-06-27 NOTE — TELEPHONE ENCOUNTER
Janett/wife (Last signed Verbal Release verified) called, she was made aware of Dr. Christina's interpretation and recommendation. She states patient does not have any bleeding. She states he gets heartburn-like chest pains--> see RN Triage from today, 6/27/25.

## 2025-06-27 NOTE — TELEPHONE ENCOUNTER
DAVION Christina  Action Requested: Summary for Provider     []  Critical Lab, Recommendations Needed  [] Need Additional Advice  [x]   DAVION    []   Need Orders  [] Need Medications Sent to Pharmacy  []  Other     SUMMARY: Intermittent chest discomfort/esophageal discomfort after eating meals and laying down--> trigger identified: Garlic. Sour taste with episodes; last episode patient reports was 5 weeks ago; took Tums. Recent EKG 2 days ago Normal. Asymptomatic at this time. Visit scheduled Monday with Dr. Christina, agreeable to Emergency Department or Immediate Care if new or worsening symptoms. [See below for more details]     Reason for call: Chest Pain and Gastro-esophageal Reflux  Onset:  > 6 months    Reason for Disposition   Patient says chest pain feels exactly the same as previously diagnosed 'heartburn' and describes burning in chest and accompanying sour taste in mouth     GERD not previously diagnosed   Intermittent pains shoot into chest, with sour taste in mouth    Additional Information   Negative: Chest pain lasting longer than 5 minutes and occurred in last 3 days (72 hours) (Exception: Feels exactly the same as previously diagnosed heartburn and has accompanying sour taste in mouth.)     Recent EKG Normal    Protocols used: Chest Pain-A-OH, Abdominal Pain - Upper-A-OH      [See telephone encounter from yesterday, 6/26/25 as well]    Wife/Janett (Last signed Verbal Release verified) called, patient is at work right now and he is not allowed to answer his phone at work, she states if we call patient he will not answer. She called related to speaking with Dr. Dowling about decreased Red Blood Cells on last year's labs--> no decreased RBC seen. She states patient has had some chest pain related to heartburn, patient is suspecting it is esophageal pain, pain is present everyday and moderate, about 30 minutes each time. Patient takes Tums and Pepcid/omeprazole OTC each day or twice daily. She states patient has  some difficulty breathing 1-2 times a week, she states he tells her he is having a hard time breathing and then lays down. With each episode he reports burning of his chest. She has never heard patient say he has a sour taste in mouth. She denies seeing any visible sweat when episodes occur, denies any hemoptysis. I advised that patient go to [facility] or Emergency Department--> she states she has told him to go, but he declines.         I called patient, he states the chest pain/esophogeal pain has been going on for a very long time, it has been intermittent, last episode was 5 weeks ago. He notices the pain comes when after eats, he eats garlic and states it may be the trigger, he admits he continues to eat it. He drinks soda for the caffeine content. No Tums use for about 5 weeks, has previously takes it once a day, 1 Tums, he denies taking any omeprazole or Pepcid OTC, he will consider asking pharmacist about them and taking as directed. He has to sleep elevated, with 1 extra pillow. He states he has never been diagnosed with GERD to heartburn. He denies any shortness of breath, chest pain, and/or chest tightness at this time; he admits he has some jaw pain since he cracked his tooth- he has a temporary tooth, he saw dentist and tooth pain subsided. Some fatigue noted. Denies sweating. He does get sour taste in mouth when he lays down. Denies any abdominal distention or abdominal pain. Refer to system/assessment yes/no answers [although abdominal pain was not reported, no protocol for heartburn or GERD, so upper abdominal pain protocol was used as well] Visit scheduled for Monday with Dr. Christina [used RES24]; he may advocate for referral to gastroenterology for EGD and Colonoscopy. Patient instructed any new or worsening symptoms [reviewed] seek immediate medical attention-->Emergency Department/Immediate Care--> very agreeable. Home Care Advice discussed, per protocol. Patient verbalized  understanding. No further questions or concerns at this time.    Future Appointments   Date Time Provider Department Center   6/30/2025  3:30 PM Bin Christina MD [facility]

## 2025-06-28 ENCOUNTER — APPOINTMENT (OUTPATIENT)
Dept: CT IMAGING | Facility: HOSPITAL | Age: 55
End: 2025-06-28
Attending: EMERGENCY MEDICINE
Payer: COMMERCIAL

## 2025-06-28 ENCOUNTER — HOSPITAL ENCOUNTER (EMERGENCY)
Facility: HOSPITAL | Age: 55
Discharge: HOME | End: 2025-06-28
Attending: EMERGENCY MEDICINE
Payer: COMMERCIAL

## 2025-06-28 VITALS
SYSTOLIC BLOOD PRESSURE: 144 MMHG | WEIGHT: 290 LBS | BODY MASS INDEX: 41 KG/M2 | DIASTOLIC BLOOD PRESSURE: 84 MMHG | RESPIRATION RATE: 17 BRPM | TEMPERATURE: 98 F | HEART RATE: 73 BPM | OXYGEN SATURATION: 96 %

## 2025-06-28 DIAGNOSIS — K02.9 DENTAL CARIES: ICD-10-CM

## 2025-06-28 DIAGNOSIS — I10 HYPERTENSION, UNSPECIFIED TYPE: ICD-10-CM

## 2025-06-28 DIAGNOSIS — T50.5X5A EXPOSURE TO PHENTERMINE, INITIAL ENCOUNTER: ICD-10-CM

## 2025-06-28 DIAGNOSIS — R51.9 NONINTRACTABLE HEADACHE, UNSPECIFIED CHRONICITY PATTERN, UNSPECIFIED HEADACHE TYPE: Primary | ICD-10-CM

## 2025-06-28 LAB
ANION GAP SERPL CALC-SCNC: 6 MMOL/L (ref 0–18)
BASOPHILS # BLD AUTO: 0.02 X10(3) UL (ref 0–0.2)
BASOPHILS NFR BLD AUTO: 0.3 %
BUN BLD-MCNC: 9 MG/DL (ref 9–23)
BUN/CREAT SERPL: 8.3 (ref 10–20)
CALCIUM BLD-MCNC: 9.4 MG/DL (ref 8.7–10.4)
CHLORIDE SERPL-SCNC: 104 MMOL/L (ref 98–112)
CO2 SERPL-SCNC: 30 MMOL/L (ref 21–32)
CREAT BLD-MCNC: 1.08 MG/DL (ref 0.7–1.3)
DEPRECATED RDW RBC AUTO: 38.6 FL (ref 35.1–46.3)
EGFRCR SERPLBLD CKD-EPI 2021: 82 ML/MIN/1.73M2 (ref 60–?)
EOSINOPHIL # BLD AUTO: 0.09 X10(3) UL (ref 0–0.7)
EOSINOPHIL NFR BLD AUTO: 1.3 %
ERYTHROCYTE [DISTWIDTH] IN BLOOD BY AUTOMATED COUNT: 13 % (ref 11–15)
GLUCOSE BLD-MCNC: 123 MG/DL (ref 70–99)
HCT VFR BLD AUTO: 46.1 % (ref 39–53)
HGB BLD-MCNC: 15.9 G/DL (ref 13–17.5)
IMM GRANULOCYTES # BLD AUTO: 0.02 X10(3) UL (ref 0–1)
IMM GRANULOCYTES NFR BLD: 0.3 %
LYMPHOCYTES # BLD AUTO: 1.29 X10(3) UL (ref 1–4)
LYMPHOCYTES NFR BLD AUTO: 19 %
MCH RBC QN AUTO: 28.5 PG (ref 26–34)
MCHC RBC AUTO-ENTMCNC: 34.5 G/DL (ref 31–37)
MCV RBC AUTO: 82.8 FL (ref 80–100)
MONOCYTES # BLD AUTO: 0.52 X10(3) UL (ref 0.1–1)
MONOCYTES NFR BLD AUTO: 7.7 %
NEUTROPHILS # BLD AUTO: 4.85 X10 (3) UL (ref 1.5–7.7)
NEUTROPHILS # BLD AUTO: 4.85 X10(3) UL (ref 1.5–7.7)
NEUTROPHILS NFR BLD AUTO: 71.4 %
OSMOLALITY SERPL CALC.SUM OF ELEC: 290 MOSM/KG (ref 275–295)
PLATELET # BLD AUTO: 197 10(3)UL (ref 150–450)
POTASSIUM SERPL-SCNC: 4.7 MMOL/L (ref 3.5–5.1)
RBC # BLD AUTO: 5.57 X10(6)UL (ref 4.3–5.7)
SODIUM SERPL-SCNC: 140 MMOL/L (ref 136–145)
TROPONIN I SERPL HS-MCNC: 4 NG/L (ref ?–53)
TSI SER-ACNC: 1.26 UIU/ML (ref 0.55–4.78)
WBC # BLD AUTO: 6.8 X10(3) UL (ref 4–11)

## 2025-06-28 PROCEDURE — 80048 BASIC METABOLIC PNL TOTAL CA: CPT | Performed by: EMERGENCY MEDICINE

## 2025-06-28 PROCEDURE — 99284 EMERGENCY DEPT VISIT MOD MDM: CPT

## 2025-06-28 PROCEDURE — 85025 COMPLETE CBC W/AUTO DIFF WBC: CPT | Performed by: EMERGENCY MEDICINE

## 2025-06-28 PROCEDURE — 70450 CT HEAD/BRAIN W/O DYE: CPT | Performed by: EMERGENCY MEDICINE

## 2025-06-28 PROCEDURE — 96374 THER/PROPH/DIAG INJ IV PUSH: CPT

## 2025-06-28 PROCEDURE — 84443 ASSAY THYROID STIM HORMONE: CPT | Performed by: EMERGENCY MEDICINE

## 2025-06-28 PROCEDURE — 93010 ELECTROCARDIOGRAM REPORT: CPT

## 2025-06-28 PROCEDURE — 99285 EMERGENCY DEPT VISIT HI MDM: CPT

## 2025-06-28 PROCEDURE — 93005 ELECTROCARDIOGRAM TRACING: CPT

## 2025-06-28 PROCEDURE — 96375 TX/PRO/DX INJ NEW DRUG ADDON: CPT

## 2025-06-28 PROCEDURE — 84484 ASSAY OF TROPONIN QUANT: CPT | Performed by: EMERGENCY MEDICINE

## 2025-06-28 PROCEDURE — 96361 HYDRATE IV INFUSION ADD-ON: CPT

## 2025-06-28 RX ORDER — DIPHENHYDRAMINE HYDROCHLORIDE 50 MG/ML
25 INJECTION, SOLUTION INTRAMUSCULAR; INTRAVENOUS ONCE
Status: COMPLETED | OUTPATIENT
Start: 2025-06-28 | End: 2025-06-28

## 2025-06-28 RX ORDER — METOCLOPRAMIDE 10 MG/1
10 TABLET ORAL EVERY 6 HOURS PRN
Qty: 20 TABLET | Refills: 0 | Status: SHIPPED | OUTPATIENT
Start: 2025-06-28 | End: 2025-07-03

## 2025-06-28 RX ORDER — PENICILLIN V POTASSIUM 500 MG/1
500 TABLET, FILM COATED ORAL 4 TIMES DAILY
Qty: 40 TABLET | Refills: 0 | Status: SHIPPED | OUTPATIENT
Start: 2025-06-28 | End: 2025-07-08

## 2025-06-28 RX ORDER — KETOROLAC TROMETHAMINE 15 MG/ML
15 INJECTION, SOLUTION INTRAMUSCULAR; INTRAVENOUS ONCE
Status: COMPLETED | OUTPATIENT
Start: 2025-06-28 | End: 2025-06-28

## 2025-06-28 RX ORDER — METOCLOPRAMIDE HYDROCHLORIDE 5 MG/ML
10 INJECTION INTRAMUSCULAR; INTRAVENOUS ONCE
Status: COMPLETED | OUTPATIENT
Start: 2025-06-28 | End: 2025-06-28

## 2025-06-28 RX ORDER — KETOROLAC TROMETHAMINE 10 MG/1
10 TABLET, FILM COATED ORAL EVERY 6 HOURS PRN
Qty: 20 TABLET | Refills: 0 | Status: SHIPPED | OUTPATIENT
Start: 2025-06-28 | End: 2025-07-03

## 2025-06-28 NOTE — ED INITIAL ASSESSMENT (HPI)
Patient presents with headache since yesterday. Patient checked blood pressure last night 230/118.  Hx of high blood pressure. Patient took 10mg of lisinopril 10pm last night and 8am this morning.    Denies chest pain or shortness of breath

## 2025-06-28 NOTE — ED PROVIDER NOTES
Patient Seen in: [facility] Emergency Department    History     Chief Complaint   Patient presents with    Hypertension    Headache     Stated Complaint: high bp     HPI    54-year-old male past medical history of hypertension, obesity with recent bariatric medicine evaluation/consultation which patient started on phentermine presenting for evaluation of bifrontal cephalgia after initiating phentermine associate with elevated blood pressure and noting maximal readings of 210/130s.  No vision loss or focal weakness/paresthesias.  No other new medications or dosage changes.  No chest pain or shortness of breath, no exertional complaints.  No trauma.  No preceding infectious complaints.  Taking acetaminophen/ibuprofen for both cephalgia addition to dentalgia. Phentermine new without other new meds/dosage changes; drinks soda on regular basis without other caffeine/stimulant use. On lisinopril 10mg daily, doubling dose recently in setting of elevated blood pressure.    Past Medical History[1]    Past Surgical History[2]         Family History[3]    Short Social Hx on File[4]    Review of Systems :  Constitutional: As per HPI  HENT: Negative for ear pain and rhinorrhea.  Positive for dentalgia.  Eyes: Negative for discharge and visual disturbance.   Respiratory: Negative for cough and shortness of breath.    Cardiovascular: Negative for chest pain and palpitations.   Gastrointestinal: Negative for vomiting and abdominal pain.   Genitourinary: Negative for dysuria and hematuria.   Musculoskeletal: Negative for joint swelling and arthralgias.   Skin: Negative for rash. No itching.    Neurological: Negative for syncope; (+) headaches.     Positive for stated complaint: high bp  Other systems are as noted in HPI.  Constitutional and vital signs reviewed.      All other systems reviewed and negative except as noted above.    PSFH elements reviewed from today and agreed except as otherwise stated in HPI.    Physical  Exam     ED Triage Vitals [06/28/25 1139]   BP (!) 187/102   Pulse 82   Resp 16   Temp 98.2 °F (36.8 °C)   Temp src Oral   SpO2 98 %   O2 Device None (Room air)       Current:BP (!) 187/102   Pulse 82   Temp 98.2 °F (36.8 °C) (Oral)   Resp 16   Wt 131.5 kg   SpO2 98%   BMI 41.37 kg/m²         Physical Exam   Constitutional: No distress.   HEENT: MMM. Left maxillary/mandibular dental caries without evidence for ANUG/periapical abscess.  No tongue elevation or drooling/stridor.  Head: Normocephalic.   Eyes: No injection.  No photophobia.  Neck: Neck supple.  No meningismus.  Cardiovascular: RRR.   Pulmonary/Chest: Effort normal. CTAB.  Abdominal: Soft.  Nontender.  Musculoskeletal: No gross deformity.  Neurological: Alert. CN II-XII grossly intact. BUE/BLE proximally and distally with 5/5 strength proximally and distally.  Skin: Skin is warm.   Psychiatric: Cooperative.  Nursing note and vitals reviewed.        ED Course     Labs Reviewed   BASIC METABOLIC PANEL (8) - Abnormal; Notable for the following components:       Result Value    Glucose 123 (*)     BUN/CREA Ratio 8.3 (*)     All other components within normal limits   TROPONIN I HIGH SENSITIVITY - Normal   TSH W REFLEX TO FREE T4 - Normal   CBC WITH DIFFERENTIAL WITH PLATELET     EKG    Rate, intervals and axes as noted on EKG Report.  Rate: 85  Rhythm: Sinus Rhythm  Reading: NSR 85 without ANNMARIE as independently interpreted by myself           CT BRAIN OR HEAD (CPT=70450)  Result Date: 6/28/2025  PROCEDURE: CT BRAIN OR HEAD (CPT=70450) INDICATIONS: None high bp PATIENT STATED HISTORY: Hypertension; Headache COMPARISON: There are no comparisons for this exam. TECHNIQUE: Multiple noncontrast axial sections were obtained of the brain with sagittal and coronal reformatted images. Dose reduction techniques were used. Dose information is transmitted to the ACR (American College of Radiology) NRDR (National Radiology Data Registry) which includes the Dose Index  Registry. FINDINGS: CEREBRUM: Chronic left basal ganglionic and anterior internal capsule lacunar infarct. Mild diminished attenuation in the frontal subcortical white matter. No edema, hemorrhage, mass or acute infarct. CEREBELLUM: No edema, hemorrhage, mass or acute infarct. BRAINSTEM: No edema, hemorrhage, mass or acute infarct. CSF SPACES: No hydrocephalus, subarachnoid hemorrhage, or mass. SKULL: Skull base and calvarium are intact. SINUSES: Limited views demonstrate no significant finding. ORBITS: Limited views are unremarkable. OTHER:Negative.     CONCLUSION: No acute intracranial finding. Changes reflecting mild chronic small vessel disease in both cerebral hemispheres. Electronically Verified and Signed by Attending Radiologist: Lam Khan MD 6/28/2025 1:02 PM Workstation: [id number]    Heart Score:    HEART Score      Title      Criteria Score   Age: 45-64 Age Score: 1   History: Slightly Suspicious Hx Score: 0     EKG: Normal EKG Score: 0   HTN: Yes   Hypercholesterolemia: No   Atherosclerosis/PVD: No     DM: No   BMI>30kg/m2: Yes   Smoking: No   Family History: No         Other Risk Factor Score: 2             Lab Results   Component Value Date    TROPHS 4 06/28/2025           HEART Score: 2        Risk of adverse cardiac event is 0.9-1.7%              ED Course as of 06/28/25 1447  ------------------------------------------------------------  Time: 06/28 1421  Comment: Resting comfortably with improving symptoms and concurrently with BP improvement without vasoactive intervention.       MDM   DIFFERENTIAL DIAGNOSIS: After history and physical exam differential diagnosis includes but is not limited to ICH, electrolyte/thyroid derangement.    Pulse ox: 98%:Normal on RA, as independently interpreted by myself    Medical Decision Making  Evaluation for cephalgia/elevated blood pressure and neurologically intact patient with no preceding traumatic infectious complaints and with recent initiation of  phentermine without overt exertional cardiopulmonary complaints.  Labs including TSH and troponin nonacute in low risk HEART patient with clinical/hemodynamic improvement after headache meds - stable for discharge with symptomatic care and ongoing outpatient followup, advised to hold phentermine pending re-evaluation.    Problems Addressed:  Dental caries: acute illness or injury  Exposure to phentermine, initial encounter: acute illness or injury  Hypertension, unspecified type: chronic illness or injury  Nonintractable headache, unspecified chronicity pattern, unspecified headache type: acute illness or injury    Amount and/or Complexity of Data Reviewed  Independent Historian: spouse     Details: wife at bedside for collateral history  External Data Reviewed: labs, radiology, ECG and notes.     Details: 6/25/2025 outpatient clinic note/EKG report, 3/16/2024 CBC, 8/2015 nuclear stress test result reviewed  Labs: ordered. Decision-making details documented in ED Course.  Radiology: ordered and independent interpretation performed. Decision-making details documented in ED Course.     Details: CTH without obvious ICH as independently interpreted by myself  ECG/medicine tests: ordered and independent interpretation performed. Decision-making details documented in ED Course.    Risk  Prescription drug management.      I was wearing at minimum a facemask and eye protection throughout this encounter with handwashing performed prior and after patient evaluation without personal hand/facial/oropharyngeal contact and gloves worn throughout encounter. See note and/or contact this provider for further PPE details.    We recommend that you schedule follow up care with a primary care provider within the next three months to obtain basic health screening including reassessment of your blood pressure.      You had elevated blood pressure today and you need to follow up with your doctor for a repeat blood pressure check and  further discussion of lifestyle modifications that include Weight Reduction - Dietary Sodium Restriction - Increased Physical Activity and Moderation in alcohol (ETOH) Consumption. If possible check your pressure at home and keep a blood pressure log to bring to your physician.  Disposition and Plan     Clinical Impression:  1. Nonintractable headache, unspecified chronicity pattern, unspecified headache type    2. Hypertension, unspecified type    3. Exposure to phentermine, initial encounter    4. Dental caries        Disposition:  Discharge    Follow-up:  Bin Christina MD  [address] [facility] [id number] [phone]    Call  For followup and re-evaluation.    Paul Dowling MD  [address] [facility] [address]  [facility] [id number] [phone]    Call  For bariatric followup and re-evaluation.    Dasha Garcia DO  [address] [facility] [address]  [facility] [id number] [phone]    Call  For neurology followup and re-evaluation.      Medications Prescribed:  Current Discharge Medication List        START taking these medications    Details   metoclopramide 10 MG Oral Tab Take 1 tablet (10 mg total) by mouth every 6 (six) hours as needed (For headache/nausea/vomiting.).  Qty: 20 tablet, Refills: 0      penicillin v potassium 500 MG Oral Tab Take 1 tablet (500 mg total) by mouth 4 (four) times daily for 10 days.  Qty: 40 tablet, Refills: 0      Ketorolac Tromethamine 10 MG Oral Tab Take 1 tablet (10 mg total) by mouth every 6 (six) hours as needed for Pain. Avoid taking other NSAIDs while on this medication including naproxen/aleve, ibuprofen/motrin, meloxicam/mobic. Patient received dose of parenteral ketorolac in the Emergency Department.  Qty: 20 tablet, Refills: 0                      [1]   Past Medical History:   Essential hypertension   [2]   Past Surgical History:  Procedure Laterality Date    Head surgery proc unlisted     [3]   Family History  Problem Relation Age of Onset    Hypertension Father     Heart  Disorder Father     Hypertension Mother     Diabetes Maternal Grandfather    [4]   Social History  Socioeconomic History    Marital status:    Tobacco Use    Smoking status: Never    Smokeless tobacco: Never   Vaping Use    Vaping status: Never Used   Substance and Sexual Activity    Alcohol use: No     Alcohol/week: 0.0 standard drinks of alcohol    Drug use: No

## 2025-06-28 NOTE — DISCHARGE INSTRUCTIONS
Hold the phentermine as discussed, followup with your care team for ongoing symptom and blood pressure monitoring/management.

## 2025-06-29 LAB
ATRIAL RATE: 85 BPM
P AXIS: 50 DEGREES
P-R INTERVAL: 156 MS
Q-T INTERVAL: 352 MS
QRS DURATION: 86 MS
QTC CALCULATION (BEZET): 418 MS
R AXIS: -12 DEGREES
T AXIS: 44 DEGREES
VENTRICULAR RATE: 85 BPM

## 2025-06-30 ENCOUNTER — OFFICE VISIT (OUTPATIENT)
Dept: FAMILY MEDICINE CLINIC | Facility: CLINIC | Age: 55
End: 2025-06-30

## 2025-06-30 VITALS
SYSTOLIC BLOOD PRESSURE: 164 MMHG | DIASTOLIC BLOOD PRESSURE: 100 MMHG | HEART RATE: 86 BPM | HEIGHT: 70.3 IN | BODY MASS INDEX: 40.63 KG/M2 | RESPIRATION RATE: 18 BRPM | WEIGHT: 287 LBS | TEMPERATURE: 98 F

## 2025-06-30 DIAGNOSIS — K21.9 GASTROESOPHAGEAL REFLUX DISEASE, UNSPECIFIED WHETHER ESOPHAGITIS PRESENT: ICD-10-CM

## 2025-06-30 DIAGNOSIS — I10 ESSENTIAL HYPERTENSION: Primary | ICD-10-CM

## 2025-06-30 DIAGNOSIS — Z12.11 COLON CANCER SCREENING: ICD-10-CM

## 2025-06-30 PROCEDURE — 3077F SYST BP >= 140 MM HG: CPT | Performed by: FAMILY MEDICINE

## 2025-06-30 PROCEDURE — 99214 OFFICE O/P EST MOD 30 MIN: CPT | Performed by: FAMILY MEDICINE

## 2025-06-30 PROCEDURE — 3080F DIAST BP >= 90 MM HG: CPT | Performed by: FAMILY MEDICINE

## 2025-06-30 PROCEDURE — 3008F BODY MASS INDEX DOCD: CPT | Performed by: FAMILY MEDICINE

## 2025-06-30 RX ORDER — LISINOPRIL 20 MG/1
20 TABLET ORAL DAILY
Qty: 90 TABLET | Refills: 1 | Status: SHIPPED | OUTPATIENT
Start: 2025-06-30

## 2025-06-30 NOTE — PROGRESS NOTES
Subjective:   Patient ID: Raul Carrillo is a 54 year old male.    Pt presents for follow up from the ER for headache and elevated blood pressure. Pt had CT head and EKG, blood work which were unremarkable. Patient is being treated with lisinopril for blood pressure. Pt state started phentermine for weight loss and had severe headache and sig elevation of blood pressure. Stopped this.  Pt also has had hx of GERD, heartburn. Pt has been eating better for less symptoms. Needs colon colon screening also.    See ED notes below for details of evaluation and treatment in the ED    Nonintractable headache, unspecified chronicity pattern, unspecified headache type +3 more  Clinical impression Hypertension · Headache  Chief complaint    ED Provider Notes  Osbaldo Lee MD (Physician) · Emergency Medicine  Patient Seen in: [facility] Emergency Department       History       Chief Complaint  Patient presents with  · Hypertension  · Headache     Stated Complaint: high bp      HPI     54-year-old male past medical history of hypertension, obesity with recent bariatric medicine evaluation/consultation which patient started on phentermine presenting for evaluation of bifrontal cephalgia after initiating phentermine associate with elevated blood pressure and noting maximal readings of 210/130s.  No vision loss or focal weakness/paresthesias.  No other new medications or dosage changes.  No chest pain or shortness of breath, no exertional complaints.  No trauma.  No preceding infectious complaints.  Taking acetaminophen/ibuprofen for both cephalgia addition to dentalgia. Phentermine new without other new meds/dosage changes; drinks soda on regular basis without other caffeine/stimulant use. On lisinopril 10mg daily, doubling dose recently in setting of elevated blood pressure.     [Past Medical History]    [Past Medical History]    · Essential hypertension       [Past Surgical History]    [Past Surgical  History]    Procedure Laterality Date  · Head surgery proc unlisted                 [Family History]    [Family History]    Problem Relation Age of Onset  · Hypertension Father    · Heart Disorder Father    · Hypertension Mother    · Diabetes Maternal Grandfather         [Short Social Hx on File]    [Short Social Hx on File]  Social History    Socioeconomic History  · Marital status:   Tobacco Use  · Smoking status: Never  · Smokeless tobacco: Never  Vaping Use  · Vaping status: Never Used  Substance and Sexual Activity  · Alcohol use: No      Alcohol/week: 0.0 standard drinks of alcohol  · Drug use: No       Review of Systems :  Constitutional: As per HPI  HENT: Negative for ear pain and rhinorrhea.  Positive for dentalgia.  Eyes: Negative for discharge and visual disturbance.   Respiratory: Negative for cough and shortness of breath.    Cardiovascular: Negative for chest pain and palpitations.   Gastrointestinal: Negative for vomiting and abdominal pain.   Genitourinary: Negative for dysuria and hematuria.   Musculoskeletal: Negative for joint swelling and arthralgias.   Skin: Negative for rash. No itching.    Neurological: Negative for syncope; (+) headaches.      Positive for stated complaint: high bp  Other systems are as noted in HPI.  Constitutional and vital signs reviewed.       All other systems reviewed and negative except as noted above.     PSFH elements reviewed from today and agreed except as otherwise stated in HPI.       Physical Exam       ED Triage Vitals [06/28/25 1139]  BP (!) 187/102  Pulse 82  Resp 16  Temp 98.2 °F (36.8 °C)  Temp src Oral  SpO2 98 %  O2 Device None (Room air)        Current:BP (!) 187/102   Pulse 82   Temp 98.2 °F (36.8 °C) (Oral)   Resp 16   Wt 131.5 kg   SpO2 98%   BMI 41.37 kg/m²            Physical Exam   Constitutional: No distress.   HEENT: MMM. Left maxillary/mandibular dental caries without evidence for ANUG/periapical abscess.  No tongue elevation or  drooling/stridor.  Head: Normocephalic.   Eyes: No injection.  No photophobia.  Neck: Neck supple.  No meningismus.  Cardiovascular: RRR.   Pulmonary/Chest: Effort normal. CTAB.  Abdominal: Soft.  Nontender.  Musculoskeletal: No gross deformity.  Neurological: Alert. CN II-XII grossly intact. BUE/BLE proximally and distally with 5/5 strength proximally and distally.  Skin: Skin is warm.   Psychiatric: Cooperative.  Nursing note and vitals reviewed.             ED Course       Labs Reviewed  BASIC METABOLIC PANEL (8) - Abnormal; Notable for the following components:      Result Value     Glucose 123 (*)      BUN/CREA Ratio 8.3 (*)      All other components within normal limits  TROPONIN I HIGH SENSITIVITY - Normal  TSH W REFLEX TO FREE T4 - Normal  CBC WITH DIFFERENTIAL WITH PLATELET     EKG     Rate, intervals and axes as noted on EKG Report.  Rate: 85  Rhythm: Sinus Rhythm  Reading: NSR 85 without ANNMARIE as independently interpreted by myself                CT BRAIN OR HEAD (CPT=70450)  Result Date: 6/28/2025  PROCEDURE: CT BRAIN OR HEAD (CPT=70450) INDICATIONS: None high bp PATIENT STATED HISTORY: Hypertension; Headache COMPARISON: There are no comparisons for this exam. TECHNIQUE: Multiple noncontrast axial sections were obtained of the brain with sagittal and coronal reformatted images. Dose reduction techniques were used. Dose information is transmitted to the ACR (American College of Radiology) NRDR (National Radiology Data Registry) which includes the Dose Index Registry. FINDINGS: CEREBRUM: Chronic left basal ganglionic and anterior internal capsule lacunar infarct. Mild diminished attenuation in the frontal subcortical white matter. No edema, hemorrhage, mass or acute infarct. CEREBELLUM: No edema, hemorrhage, mass or acute infarct. BRAINSTEM: No edema, hemorrhage, mass or acute infarct. CSF SPACES: No hydrocephalus, subarachnoid hemorrhage, or mass. SKULL: Skull base and calvarium are intact. SINUSES: Limited  views demonstrate no significant finding. ORBITS: Limited views are unremarkable. OTHER:Negative.      CONCLUSION: No acute intracranial finding. Changes reflecting mild chronic small vessel disease in both cerebral hemispheres. Electronically Verified and Signed by Attending Radiologist: Lam Khan MD 6/28/2025 1:02 PM Workstation: [id number]     Heart Score:       HEART Score        Title        Criteria Score  Age: 45-64 Age Score: 1  History: Slightly Suspicious Hx Score: 0       EKG: Normal EKG Score: 0  HTN: Yes  Hypercholesterolemia: No  Atherosclerosis/PVD: No       DM: No  BMI>30kg/m2: Yes  Smoking: No  Family History: No        Other Risk Factor Score: 2                  Lab Results  Component Value Date    TROPHS 4 06/28/2025              HEART Score: 2           Risk of adverse cardiac event is 0.9-1.7%                    ED Course as of 06/28/25 1447  ------------------------------------------------------------  Time: 06/28 1421  Comment: Resting comfortably with improving symptoms and concurrently with BP improvement without vasoactive intervention.          MDM  DIFFERENTIAL DIAGNOSIS: After history and physical exam differential diagnosis includes but is not limited to ICH, electrolyte/thyroid derangement.     Pulse ox: 98%:Normal on RA, as independently interpreted by myself     Medical Decision Making  Evaluation for cephalgia/elevated blood pressure and neurologically intact patient with no preceding traumatic infectious complaints and with recent initiation of phentermine without overt exertional cardiopulmonary complaints.  Labs including TSH and troponin nonacute in low risk HEART patient with clinical/hemodynamic improvement after headache meds - stable for discharge with symptomatic care and ongoing outpatient followup, advised to hold phentermine pending re-evaluation.     Problems Addressed:  Dental caries: acute illness or injury  Exposure to phentermine, initial encounter: acute  illness or injury  Hypertension, unspecified type: chronic illness or injury  Nonintractable headache, unspecified chronicity pattern, unspecified headache type: acute illness or injury     Amount and/or Complexity of Data Reviewed  Independent Historian: spouse     Details: wife at bedside for collateral history  External Data Reviewed: labs, radiology, ECG and notes.     Details: 6/25/2025 outpatient clinic note/EKG report, 3/16/2024 CBC, 8/2015 nuclear stress test result reviewed  Labs: ordered. Decision-making details documented in ED Course.  Radiology: ordered and independent interpretation performed. Decision-making details documented in ED Course.     Details: CTH without obvious ICH as independently interpreted by myself  ECG/medicine tests: ordered and independent interpretation performed. Decision-making details documented in ED Course.     Risk  Prescription drug management.        I was wearing at minimum a facemask and eye protection throughout this encounter with handwashing performed prior and after patient evaluation without personal hand/facial/oropharyngeal contact and gloves worn throughout encounter. See note and/or contact this provider for further PPE details.     We recommend that you schedule follow up care with a primary care provider within the next three months to obtain basic health screening including reassessment of your blood pressure.       You had elevated blood pressure today and you need to follow up with your doctor for a repeat blood pressure check and further discussion of lifestyle modifications that include Weight Reduction - Dietary Sodium Restriction - Increased Physical Activity and Moderation in alcohol (ETOH) Consumption. If possible check your pressure at home and keep a blood pressure log to bring to your physician.    Disposition and Plan     Clinical Impression:    1. Nonintractable headache, unspecified chronicity pattern, unspecified headache type    2. Hypertension, unspecified type   3. Exposure to phentermine, initial encounter   4. Dental caries         Disposition:  Discharge     Follow-up:  Bin Christina MD  [address]  [facility] [id number] [phone]     Call  For followup and re-evaluation.     Paul Dowling MD  [address] [facility] [address]  [facility] [id number] [phone]     Call  For bariatric followup and re-evaluation.     Dasha Garcia DO  [address] [facility] [address]  [facility] [id number] [phone]     Call  For neurology followup and re-evaluation.                 History/Other:   Review of Systems   Eyes:  Negative for visual disturbance.   Respiratory:  Negative for cough and shortness of breath.    Cardiovascular:  Negative for chest pain.   Gastrointestinal:  Negative for abdominal pain and blood in stool.   Neurological:  Negative for dizziness and headaches.   Psychiatric/Behavioral:  Negative for dysphoric mood. The patient is not nervous/anxious.      Current Medications[1]  Allergies:Allergies[2]    Objective:   Physical Exam  Constitutional:       Appearance: Normal appearance.   Cardiovascular:      Rate and Rhythm: Normal rate and regular rhythm.   Pulmonary:      Effort: Pulmonary effort is normal.      Breath sounds: Normal breath sounds.   Abdominal:      General: Abdomen is flat. There is no distension.      Tenderness: There is no abdominal tenderness. There is no guarding or rebound.   Neurological:      Mental Status: He is alert.   Psychiatric:         Mood and Affect: Mood normal.         Behavior: Behavior normal.         Thought Content: Thought content normal.         Judgment: Judgment normal.         Assessment & Plan:   1. Essential hypertension: elevated: reviewed ER notes  - After discussion, will  lisinopril as discussed to 20mg daily; to call if problems or side effects; follow up in one month to reevaluate blood pressure. To monitor blood pressure at home; To call if any persistent elevation of blood  pressure; Discussed good diet and activity; Follow up as needed.   To stop phentermine     2. Gastroesophageal reflux disease, unspecified whether esophagitis present /   3. Colon cancer screening:  - After discussion, will send to GI for further evaluation and treatment; To call if any significant symptoms.          No orders of the defined types were placed in this encounter.      Meds This Visit:  Requested Prescriptions     Signed Prescriptions Disp Refills    lisinopril 20 MG Oral Tab 90 tablet 1     Sig: Take 1 tablet (20 mg total) by mouth daily.       Imaging & Referrals:  [facility] - INTERNAL         [1]   Current Outpatient Medications   Medication Sig Dispense Refill    lisinopril 20 MG Oral Tab Take 1 tablet (20 mg total) by mouth daily. 90 tablet 1    metoclopramide 10 MG Oral Tab Take 1 tablet (10 mg total) by mouth every 6 (six) hours as needed (For headache/nausea/vomiting.). 20 tablet 0    penicillin v potassium 500 MG Oral Tab Take 1 tablet (500 mg total) by mouth 4 (four) times daily for 10 days. 40 tablet 0    Ketorolac Tromethamine 10 MG Oral Tab Take 1 tablet (10 mg total) by mouth every 6 (six) hours as needed for Pain. Avoid taking other NSAIDs while on this medication including naproxen/aleve, ibuprofen/motrin, meloxicam/mobic. Patient received dose of parenteral ketorolac in the Emergency Department. 20 tablet 0    Phentermine HCl 30 MG Oral Cap Take 1 capsule (30 mg total) by mouth every morning. 30 capsule 5   [2] No Known Allergies

## 2025-07-21 ENCOUNTER — OFFICE VISIT (OUTPATIENT)
Dept: FAMILY MEDICINE CLINIC | Facility: CLINIC | Age: 55
End: 2025-07-21

## 2025-07-21 VITALS
WEIGHT: 284 LBS | TEMPERATURE: 98 F | SYSTOLIC BLOOD PRESSURE: 140 MMHG | RESPIRATION RATE: 16 BRPM | HEIGHT: 70.3 IN | BODY MASS INDEX: 40.2 KG/M2 | HEART RATE: 90 BPM | DIASTOLIC BLOOD PRESSURE: 80 MMHG

## 2025-07-21 DIAGNOSIS — I10 ESSENTIAL HYPERTENSION: Primary | ICD-10-CM

## 2025-07-21 PROCEDURE — 99213 OFFICE O/P EST LOW 20 MIN: CPT | Performed by: FAMILY MEDICINE

## 2025-07-21 PROCEDURE — 3079F DIAST BP 80-89 MM HG: CPT | Performed by: FAMILY MEDICINE

## 2025-07-21 PROCEDURE — 3008F BODY MASS INDEX DOCD: CPT | Performed by: FAMILY MEDICINE

## 2025-07-21 PROCEDURE — 3077F SYST BP >= 140 MM HG: CPT | Performed by: FAMILY MEDICINE

## 2025-07-21 NOTE — PROGRESS NOTES
Subjective:   Patient ID: Raul Carrillo is a 54 year old male.    Patient is here for follow up for chronic medical issues- hypertension. The patient is compliant with medications and no side effects. There are no acute issues and patient does not need refills. The patient states medications have been helpful and effective.  Has been eating better and active. Blood pressure at home still high at times.          History/Other:   Review of Systems   Respiratory:  Negative for cough and shortness of breath.    Cardiovascular:  Negative for chest pain.   Neurological:  Negative for dizziness and headaches.     Current Medications[1]  Allergies:Allergies[2]    Objective:   Physical Exam  Constitutional:       Appearance: Normal appearance.   Cardiovascular:      Rate and Rhythm: Normal rate and regular rhythm.      Pulses: Normal pulses.      Heart sounds: Normal heart sounds.   Pulmonary:      Effort: Pulmonary effort is normal.   Neurological:      General: No focal deficit present.      Mental Status: He is alert and oriented to person, place, and time.         Assessment & Plan:   1. Essential hypertension ; improving:  - After discussion, will increase lisinopril to 30mg daily as discussed; discussed benefits and potential side effects; to call if problems or side effects; To monitor blood pressure at home; To call if any persistent elevation of blood pressure; Discussed good diet and activity; Follow up as needed.            No orders of the defined types were placed in this encounter.      Meds This Visit:  Requested Prescriptions      No prescriptions requested or ordered in this encounter       Imaging & Referrals:  None         [1]   Current Outpatient Medications   Medication Sig Dispense Refill    lisinopril 20 MG Oral Tab Take 1 tablet (20 mg total) by mouth daily. 90 tablet 1    Phentermine HCl 30 MG Oral Cap Take 1 capsule (30 mg total) by mouth every morning. (Patient not taking: Reported on 7/21/2025) 30  capsule 5   [2] No Known Allergies

## (undated) NOTE — MR AVS SNAPSHOT
[facility] SPECIALTY [facility] - [place] [address] [address] [address]  [phone]               Thank you for choosing us for your health care visit with Kesha Jaeger MD.  We are glad to serve you and happy to provide you with this summary of y [place] [id number]       Comment:  Referral for Evaluation    Comprehensive Hearing Evaluation      Referral Orders      Normal Orders This Visit    NEURO - INTERNAL [[id number] CUSTOM]  Order #:  [id number] Assoc Dx:   Memory deficits [R41.3]        OP REFERRAL TO AUDIOLOGY [[id number] CUSTOM]  Order #:  [id number]         **REFERRAL REQUEST**    Your physician has referred you to a specialist.  Your physician or the clinic staff will provide you with the phone Where to Get Your Medications      These medications were sent to [facility] [place] - [address] [facility], [phone] [address] [facility] [facility] [place] [address]     Phone:  [phone]    - lisinopril 10 MG Tabs Make half your plate fruits and vegetables Highly refined, white starches including white bread, rice and pasta   Eat plenty of protein, keep the fat content low Sugars:  sodas and sports drinks, candies and desserts   Eat plenty of low-fat dairy products

## (undated) NOTE — IP AVS SNAPSHOT
[address] [place] [address] [phone]                Discharge Summary   6/15/2017    [facility] [place]           Admission Information        Provider Department    6/15/2017 Natividad Lopez MD [facility] 5sw/Se Commonly known as:  PRINIVIL,ZESTRIL        Take 1 tablet (10 mg total) by mouth once daily.     Sonido Baca        7 am                        Where to Get Your Medications      [facility] - 1 (06/17/17)  0  (06/17/17)  9.0 (H) (06/17/17)  1.6 (06/17/17)  1.2 (H) (06/17/17)  0.1 (06/17/17)  0.0    (06/16/17)  82 (06/16/17)  8 (06/16/17)  9 (06/16/17)  0 (06/16/17)  0  (06/16/17)  12.5 (H) (06/16/17)  1.2 (06/16/17)  1.4 (H) (06/16/17)  0.0 (06 Visit [email or web address] to learn more.             _____________________________________________________________________________    Medication Side Effects - Medications to be taken at home  As your caregivers, we want you to be aware of the med

## (undated) NOTE — LETTER
12/27/18        Kelsy Shah  [address]      Dear Sivan Newberry records indicate that you have outstanding lab work and or testing that was ordered for you and has not yet been completed:  Orders Placed This Encounter      Lipid P

## (undated) NOTE — LETTER
To Whom It May Concern:    Dillon Rodríguez has been under our care regarding ongoing medical issues. Because of this, he has been required to restrict her physical activities. He was hospitalized from 6/16/17 through 6/21/17.     He may resume his usual act

## (undated) NOTE — MR AVS SNAPSHOT
[facility] SPECIALTY [facility] - [place] [address] [address]  [address]  [phone]               Thank you for choosing us for your health care visit with Javier Foote MD.  We are glad to serve you and happy to provide you with this summary of y office. At that time, you will be provided with any authorization numbers or be assured that none are required. You can then schedule your appointment. Failure to obtain required authorization numbers can create reimbursement difficulties for you.     Assoc

## (undated) NOTE — LETTER
6/19/2017          To Whom It May Concern: [facility]     Please excuse Christina Jo from work as her  has been hospitalized from 6/15- present. If you require additional information please contact our office.         Sincerely,          Ronak Hensley